# Patient Record
Sex: FEMALE | Race: ASIAN | NOT HISPANIC OR LATINO | URBAN - METROPOLITAN AREA
[De-identification: names, ages, dates, MRNs, and addresses within clinical notes are randomized per-mention and may not be internally consistent; named-entity substitution may affect disease eponyms.]

---

## 2017-12-28 ENCOUNTER — EMERGENCY (EMERGENCY)
Facility: HOSPITAL | Age: 22
LOS: 1 days | Discharge: HOME | End: 2017-12-28
Attending: EMERGENCY MEDICINE | Admitting: EMERGENCY MEDICINE
Payer: COMMERCIAL

## 2017-12-28 VITALS
SYSTOLIC BLOOD PRESSURE: 117 MMHG | DIASTOLIC BLOOD PRESSURE: 78 MMHG | TEMPERATURE: 98 F | HEART RATE: 114 BPM | OXYGEN SATURATION: 100 % | RESPIRATION RATE: 18 BRPM

## 2017-12-28 PROCEDURE — 99285 EMERGENCY DEPT VISIT HI MDM: CPT | Mod: 25

## 2017-12-28 RX ORDER — SODIUM CHLORIDE 9 MG/ML
1000 INJECTION INTRAMUSCULAR; INTRAVENOUS; SUBCUTANEOUS ONCE
Qty: 0 | Refills: 0 | Status: COMPLETED | OUTPATIENT
Start: 2017-12-28 | End: 2017-12-28

## 2017-12-28 NOTE — ED PROVIDER NOTE - CHPI ED SYMPTOMS NEG
no vomiting/no fever/no diarrhea/no blood in stool/no abdominal distension/no burning urination/no chills/no hematuria

## 2017-12-28 NOTE — ED PROVIDER NOTE - OBJECTIVE STATEMENT
21 yo F  5week 3/ by dates p/w lower abd pain x past ~ 2 weeks. Pt found pos preg test 3 days ago. LMP . NO cp/sob/palp. No Dysuria. NO HEmaturia. No recent illness. no recent trauma. NO fever/chills. NO weak / dizzy / malaise. no numb/ting/focal weak. No agg/allev factors. No other inj or co.

## 2017-12-28 NOTE — ED ADULT NURSE NOTE - OBJECTIVE STATEMENT
Pt received ambulatory alert and oriented x 3 c/o abdominal pain. Pt stated 8/10 cramping pain to lower abdomen, non radiating x 2 weeks, worsen past few days.. Pt stated she is approx 6 weeks pregnant. Pt denies any nausea, vomiting, diarrhea, fever, chill, sob, chest pain. Pt also denies burning urination and hematuria. No s/s of distress noted.

## 2017-12-29 VITALS
OXYGEN SATURATION: 99 % | DIASTOLIC BLOOD PRESSURE: 79 MMHG | HEART RATE: 100 BPM | TEMPERATURE: 99 F | SYSTOLIC BLOOD PRESSURE: 126 MMHG | RESPIRATION RATE: 16 BRPM

## 2017-12-29 LAB
ALBUMIN SERPL ELPH-MCNC: 4.3 G/DL — SIGNIFICANT CHANGE UP (ref 3.3–5)
ALP SERPL-CCNC: 58 U/L — SIGNIFICANT CHANGE UP (ref 40–120)
ALT FLD-CCNC: 18 U/L — SIGNIFICANT CHANGE UP (ref 12–78)
ANION GAP SERPL CALC-SCNC: 8 MMOL/L — SIGNIFICANT CHANGE UP (ref 5–17)
APPEARANCE UR: CLEAR — SIGNIFICANT CHANGE UP
AST SERPL-CCNC: 14 U/L — LOW (ref 15–37)
BASOPHILS # BLD AUTO: 0.2 K/UL — SIGNIFICANT CHANGE UP (ref 0–0.2)
BASOPHILS NFR BLD AUTO: 1.4 % — SIGNIFICANT CHANGE UP (ref 0–2)
BILIRUB SERPL-MCNC: 0.4 MG/DL — SIGNIFICANT CHANGE UP (ref 0.2–1.2)
BILIRUB UR-MCNC: NEGATIVE — SIGNIFICANT CHANGE UP
BLD GP AB SCN SERPL QL: SIGNIFICANT CHANGE UP
BUN SERPL-MCNC: 7 MG/DL — SIGNIFICANT CHANGE UP (ref 7–23)
CALCIUM SERPL-MCNC: 9.1 MG/DL — SIGNIFICANT CHANGE UP (ref 8.5–10.1)
CHLORIDE SERPL-SCNC: 104 MMOL/L — SIGNIFICANT CHANGE UP (ref 96–108)
CO2 SERPL-SCNC: 26 MMOL/L — SIGNIFICANT CHANGE UP (ref 22–31)
COLOR SPEC: YELLOW — SIGNIFICANT CHANGE UP
CREAT SERPL-MCNC: 0.66 MG/DL — SIGNIFICANT CHANGE UP (ref 0.5–1.3)
DIFF PNL FLD: NEGATIVE — SIGNIFICANT CHANGE UP
EOSINOPHIL # BLD AUTO: 0.4 K/UL — SIGNIFICANT CHANGE UP (ref 0–0.5)
EOSINOPHIL NFR BLD AUTO: 2.9 % — SIGNIFICANT CHANGE UP (ref 0–6)
GLUCOSE SERPL-MCNC: 91 MG/DL — SIGNIFICANT CHANGE UP (ref 70–99)
GLUCOSE UR QL: NEGATIVE — SIGNIFICANT CHANGE UP
HCG SERPL-ACNC: SIGNIFICANT CHANGE UP MIU/ML
HCT VFR BLD CALC: 40.5 % — SIGNIFICANT CHANGE UP (ref 34.5–45)
HGB BLD-MCNC: 13.1 G/DL — SIGNIFICANT CHANGE UP (ref 11.5–15.5)
KETONES UR-MCNC: NEGATIVE — SIGNIFICANT CHANGE UP
LEUKOCYTE ESTERASE UR-ACNC: NEGATIVE — SIGNIFICANT CHANGE UP
LIDOCAIN IGE QN: 154 U/L — SIGNIFICANT CHANGE UP (ref 73–393)
LYMPHOCYTES # BLD AUTO: 31.3 % — SIGNIFICANT CHANGE UP (ref 13–44)
LYMPHOCYTES # BLD AUTO: 4 K/UL — HIGH (ref 1–3.3)
MCHC RBC-ENTMCNC: 27.4 PG — SIGNIFICANT CHANGE UP (ref 27–34)
MCHC RBC-ENTMCNC: 32.4 GM/DL — SIGNIFICANT CHANGE UP (ref 32–36)
MCV RBC AUTO: 84.6 FL — SIGNIFICANT CHANGE UP (ref 80–100)
MONOCYTES # BLD AUTO: 0.9 K/UL — SIGNIFICANT CHANGE UP (ref 0–0.9)
MONOCYTES NFR BLD AUTO: 7.2 % — SIGNIFICANT CHANGE UP (ref 1–9)
NEUTROPHILS # BLD AUTO: 7.2 K/UL — SIGNIFICANT CHANGE UP (ref 1.8–7.4)
NEUTROPHILS NFR BLD AUTO: 57.2 % — SIGNIFICANT CHANGE UP (ref 43–77)
NITRITE UR-MCNC: NEGATIVE — SIGNIFICANT CHANGE UP
PH UR: 6.5 — SIGNIFICANT CHANGE UP (ref 5–8)
PLATELET # BLD AUTO: 342 K/UL — SIGNIFICANT CHANGE UP (ref 150–400)
POTASSIUM SERPL-MCNC: 3.7 MMOL/L — SIGNIFICANT CHANGE UP (ref 3.5–5.3)
POTASSIUM SERPL-SCNC: 3.7 MMOL/L — SIGNIFICANT CHANGE UP (ref 3.5–5.3)
PROT SERPL-MCNC: 8 G/DL — SIGNIFICANT CHANGE UP (ref 6–8.3)
PROT UR-MCNC: NEGATIVE — SIGNIFICANT CHANGE UP
RBC # BLD: 4.79 M/UL — SIGNIFICANT CHANGE UP (ref 3.8–5.2)
RBC # FLD: 13.4 % — SIGNIFICANT CHANGE UP (ref 10.3–14.5)
SODIUM SERPL-SCNC: 138 MMOL/L — SIGNIFICANT CHANGE UP (ref 135–145)
SP GR SPEC: 1 — LOW (ref 1.01–1.02)
UROBILINOGEN FLD QL: NEGATIVE — SIGNIFICANT CHANGE UP
WBC # BLD: 12.6 K/UL — HIGH (ref 3.8–10.5)
WBC # FLD AUTO: 12.6 K/UL — HIGH (ref 3.8–10.5)

## 2017-12-29 PROCEDURE — 81003 URINALYSIS AUTO W/O SCOPE: CPT

## 2017-12-29 PROCEDURE — 84702 CHORIONIC GONADOTROPIN TEST: CPT

## 2017-12-29 PROCEDURE — 99284 EMERGENCY DEPT VISIT MOD MDM: CPT | Mod: 25

## 2017-12-29 PROCEDURE — 86850 RBC ANTIBODY SCREEN: CPT

## 2017-12-29 PROCEDURE — 76801 OB US < 14 WKS SINGLE FETUS: CPT | Mod: 26

## 2017-12-29 PROCEDURE — 83690 ASSAY OF LIPASE: CPT

## 2017-12-29 PROCEDURE — 80053 COMPREHEN METABOLIC PANEL: CPT

## 2017-12-29 PROCEDURE — 36000 PLACE NEEDLE IN VEIN: CPT

## 2017-12-29 PROCEDURE — 76801 OB US < 14 WKS SINGLE FETUS: CPT

## 2017-12-29 PROCEDURE — 86900 BLOOD TYPING SEROLOGIC ABO: CPT

## 2017-12-29 PROCEDURE — 76817 TRANSVAGINAL US OBSTETRIC: CPT | Mod: 26

## 2017-12-29 PROCEDURE — 76817 TRANSVAGINAL US OBSTETRIC: CPT

## 2017-12-29 PROCEDURE — 86901 BLOOD TYPING SEROLOGIC RH(D): CPT

## 2017-12-29 PROCEDURE — 85027 COMPLETE CBC AUTOMATED: CPT

## 2017-12-29 RX ADMIN — SODIUM CHLORIDE 1000 MILLILITER(S): 9 INJECTION INTRAMUSCULAR; INTRAVENOUS; SUBCUTANEOUS at 00:20

## 2017-12-29 RX ADMIN — SODIUM CHLORIDE 1000 MILLILITER(S): 9 INJECTION INTRAMUSCULAR; INTRAVENOUS; SUBCUTANEOUS at 01:52

## 2018-02-09 PROBLEM — Z00.00 ENCOUNTER FOR PREVENTIVE HEALTH EXAMINATION: Status: ACTIVE | Noted: 2018-02-09

## 2018-02-12 ENCOUNTER — ASOB RESULT (OUTPATIENT)
Age: 23
End: 2018-02-12

## 2018-02-12 ENCOUNTER — APPOINTMENT (OUTPATIENT)
Dept: ANTEPARTUM | Facility: HOSPITAL | Age: 23
End: 2018-02-12
Payer: MEDICAID

## 2018-02-12 PROCEDURE — 76813 OB US NUCHAL MEAS 1 GEST: CPT

## 2018-02-12 PROCEDURE — 76801 OB US < 14 WKS SINGLE FETUS: CPT

## 2018-02-12 PROCEDURE — 36416 COLLJ CAPILLARY BLOOD SPEC: CPT

## 2018-04-03 ENCOUNTER — APPOINTMENT (OUTPATIENT)
Dept: ANTEPARTUM | Facility: CLINIC | Age: 23
End: 2018-04-03

## 2018-04-26 ENCOUNTER — ASOB RESULT (OUTPATIENT)
Age: 23
End: 2018-04-26

## 2018-04-26 ENCOUNTER — APPOINTMENT (OUTPATIENT)
Dept: ANTEPARTUM | Facility: CLINIC | Age: 23
End: 2018-04-26
Payer: MEDICAID

## 2018-04-26 PROCEDURE — 76805 OB US >/= 14 WKS SNGL FETUS: CPT

## 2018-06-21 ENCOUNTER — ASOB RESULT (OUTPATIENT)
Age: 23
End: 2018-06-21

## 2018-06-21 ENCOUNTER — APPOINTMENT (OUTPATIENT)
Dept: ANTEPARTUM | Facility: CLINIC | Age: 23
End: 2018-06-21
Payer: MEDICAID

## 2018-06-21 PROCEDURE — 76819 FETAL BIOPHYS PROFIL W/O NST: CPT

## 2018-06-21 PROCEDURE — 76816 OB US FOLLOW-UP PER FETUS: CPT

## 2018-07-12 ENCOUNTER — APPOINTMENT (OUTPATIENT)
Dept: ANTEPARTUM | Facility: CLINIC | Age: 23
End: 2018-07-12
Payer: MEDICAID

## 2018-07-12 ENCOUNTER — ASOB RESULT (OUTPATIENT)
Age: 23
End: 2018-07-12

## 2018-07-12 ENCOUNTER — OUTPATIENT (OUTPATIENT)
Dept: OUTPATIENT SERVICES | Facility: HOSPITAL | Age: 23
LOS: 1 days | End: 2018-07-12

## 2018-07-12 PROCEDURE — 99241 OFFICE CONSULTATION NEW/ESTAB PATIENT 15 MIN: CPT | Mod: 25

## 2018-07-12 PROCEDURE — 76816 OB US FOLLOW-UP PER FETUS: CPT

## 2018-07-12 PROCEDURE — 76820 UMBILICAL ARTERY ECHO: CPT

## 2018-07-12 PROCEDURE — 76818 FETAL BIOPHYS PROFILE W/NST: CPT | Mod: 26

## 2018-07-19 ENCOUNTER — ASOB RESULT (OUTPATIENT)
Age: 23
End: 2018-07-19

## 2018-07-19 ENCOUNTER — APPOINTMENT (OUTPATIENT)
Dept: ANTEPARTUM | Facility: HOSPITAL | Age: 23
End: 2018-07-19

## 2018-07-19 ENCOUNTER — OUTPATIENT (OUTPATIENT)
Dept: OUTPATIENT SERVICES | Facility: HOSPITAL | Age: 23
LOS: 1 days | End: 2018-07-19

## 2018-07-19 ENCOUNTER — APPOINTMENT (OUTPATIENT)
Dept: ANTEPARTUM | Facility: CLINIC | Age: 23
End: 2018-07-19
Payer: MEDICAID

## 2018-07-19 PROCEDURE — 76818 FETAL BIOPHYS PROFILE W/NST: CPT | Mod: 26

## 2018-07-19 PROCEDURE — 76820 UMBILICAL ARTERY ECHO: CPT

## 2018-07-26 ENCOUNTER — APPOINTMENT (OUTPATIENT)
Dept: ANTEPARTUM | Facility: CLINIC | Age: 23
End: 2018-07-26
Payer: MEDICAID

## 2018-07-26 ENCOUNTER — APPOINTMENT (OUTPATIENT)
Dept: ANTEPARTUM | Facility: HOSPITAL | Age: 23
End: 2018-07-26

## 2018-07-26 ENCOUNTER — OUTPATIENT (OUTPATIENT)
Dept: OUTPATIENT SERVICES | Facility: HOSPITAL | Age: 23
LOS: 1 days | End: 2018-07-26

## 2018-07-26 ENCOUNTER — ASOB RESULT (OUTPATIENT)
Age: 23
End: 2018-07-26

## 2018-07-26 PROCEDURE — 76820 UMBILICAL ARTERY ECHO: CPT

## 2018-07-26 PROCEDURE — 76816 OB US FOLLOW-UP PER FETUS: CPT

## 2018-07-26 PROCEDURE — 76818 FETAL BIOPHYS PROFILE W/NST: CPT | Mod: 26

## 2018-08-02 ENCOUNTER — APPOINTMENT (OUTPATIENT)
Dept: ANTEPARTUM | Facility: CLINIC | Age: 23
End: 2018-08-02
Payer: MEDICAID

## 2018-08-02 ENCOUNTER — OUTPATIENT (OUTPATIENT)
Dept: OUTPATIENT SERVICES | Facility: HOSPITAL | Age: 23
LOS: 1 days | End: 2018-08-02

## 2018-08-02 ENCOUNTER — APPOINTMENT (OUTPATIENT)
Dept: ANTEPARTUM | Facility: HOSPITAL | Age: 23
End: 2018-08-02

## 2018-08-02 ENCOUNTER — ASOB RESULT (OUTPATIENT)
Age: 23
End: 2018-08-02

## 2018-08-02 PROCEDURE — 76820 UMBILICAL ARTERY ECHO: CPT

## 2018-08-02 PROCEDURE — 76818 FETAL BIOPHYS PROFILE W/NST: CPT | Mod: 26

## 2018-08-07 DIAGNOSIS — Z3A.35 35 WEEKS GESTATION OF PREGNANCY: ICD-10-CM

## 2018-08-07 DIAGNOSIS — O36.5930 MATERNAL CARE FOR OTHER KNOWN OR SUSPECTED POOR FETAL GROWTH, THIRD TRIMESTER, NOT APPLICABLE OR UNSPECIFIED: ICD-10-CM

## 2018-08-07 DIAGNOSIS — O26.841 UTERINE SIZE-DATE DISCREPANCY, FIRST TRIMESTER: ICD-10-CM

## 2018-08-07 DIAGNOSIS — Z3A.33 33 WEEKS GESTATION OF PREGNANCY: ICD-10-CM

## 2018-08-07 DIAGNOSIS — Z3A.34 34 WEEKS GESTATION OF PREGNANCY: ICD-10-CM

## 2018-08-09 ENCOUNTER — OUTPATIENT (OUTPATIENT)
Dept: OUTPATIENT SERVICES | Facility: HOSPITAL | Age: 23
LOS: 1 days | End: 2018-08-09

## 2018-08-09 ENCOUNTER — APPOINTMENT (OUTPATIENT)
Dept: ANTEPARTUM | Facility: CLINIC | Age: 23
End: 2018-08-09

## 2018-08-09 ENCOUNTER — ASOB RESULT (OUTPATIENT)
Age: 23
End: 2018-08-09

## 2018-08-09 ENCOUNTER — APPOINTMENT (OUTPATIENT)
Dept: ANTEPARTUM | Facility: HOSPITAL | Age: 23
End: 2018-08-09

## 2018-08-09 ENCOUNTER — APPOINTMENT (OUTPATIENT)
Dept: ANTEPARTUM | Facility: CLINIC | Age: 23
End: 2018-08-09
Payer: MEDICAID

## 2018-08-09 PROCEDURE — 76820 UMBILICAL ARTERY ECHO: CPT

## 2018-08-09 PROCEDURE — 76816 OB US FOLLOW-UP PER FETUS: CPT

## 2018-08-09 PROCEDURE — 76818 FETAL BIOPHYS PROFILE W/NST: CPT | Mod: 26

## 2018-08-13 ENCOUNTER — OUTPATIENT (OUTPATIENT)
Dept: OUTPATIENT SERVICES | Facility: HOSPITAL | Age: 23
LOS: 1 days | End: 2018-08-13
Payer: MEDICAID

## 2018-08-13 DIAGNOSIS — O36.5930 MATERNAL CARE FOR OTHER KNOWN OR SUSPECTED POOR FETAL GROWTH, THIRD TRIMESTER, NOT APPLICABLE OR UNSPECIFIED: ICD-10-CM

## 2018-08-13 DIAGNOSIS — Z3A.00 WEEKS OF GESTATION OF PREGNANCY NOT SPECIFIED: ICD-10-CM

## 2018-08-13 DIAGNOSIS — O26.841 UTERINE SIZE-DATE DISCREPANCY, FIRST TRIMESTER: ICD-10-CM

## 2018-08-13 DIAGNOSIS — O26.899 OTHER SPECIFIED PREGNANCY RELATED CONDITIONS, UNSPECIFIED TRIMESTER: ICD-10-CM

## 2018-08-13 DIAGNOSIS — Z3A.37 37 WEEKS GESTATION OF PREGNANCY: ICD-10-CM

## 2018-08-13 LAB
ALBUMIN SERPL ELPH-MCNC: 3.8 G/DL — SIGNIFICANT CHANGE UP (ref 3.3–5)
ALP SERPL-CCNC: 158 U/L — HIGH (ref 40–120)
ALT FLD-CCNC: 13 U/L — SIGNIFICANT CHANGE UP (ref 4–33)
APPEARANCE UR: CLEAR — SIGNIFICANT CHANGE UP
APTT BLD: 25.7 SEC — LOW (ref 27.5–37.4)
AST SERPL-CCNC: 20 U/L — SIGNIFICANT CHANGE UP (ref 4–32)
BACTERIA # UR AUTO: SIGNIFICANT CHANGE UP
BASOPHILS # BLD AUTO: 0.05 K/UL — SIGNIFICANT CHANGE UP (ref 0–0.2)
BASOPHILS NFR BLD AUTO: 0.4 % — SIGNIFICANT CHANGE UP (ref 0–2)
BILIRUB SERPL-MCNC: 0.2 MG/DL — SIGNIFICANT CHANGE UP (ref 0.2–1.2)
BILIRUB UR-MCNC: NEGATIVE — SIGNIFICANT CHANGE UP
BLOOD UR QL VISUAL: NEGATIVE — SIGNIFICANT CHANGE UP
BUN SERPL-MCNC: 7 MG/DL — SIGNIFICANT CHANGE UP (ref 7–23)
CALCIUM SERPL-MCNC: 8.9 MG/DL — SIGNIFICANT CHANGE UP (ref 8.4–10.5)
CHLORIDE SERPL-SCNC: 100 MMOL/L — SIGNIFICANT CHANGE UP (ref 98–107)
CO2 SERPL-SCNC: 22 MMOL/L — SIGNIFICANT CHANGE UP (ref 22–31)
COLOR SPEC: SIGNIFICANT CHANGE UP
CREAT ?TM UR-MCNC: 30.5 MG/DL — SIGNIFICANT CHANGE UP
CREAT SERPL-MCNC: 0.64 MG/DL — SIGNIFICANT CHANGE UP (ref 0.5–1.3)
EOSINOPHIL # BLD AUTO: 0.16 K/UL — SIGNIFICANT CHANGE UP (ref 0–0.5)
EOSINOPHIL NFR BLD AUTO: 1.2 % — SIGNIFICANT CHANGE UP (ref 0–6)
FIBRINOGEN PPP-MCNC: > 700 MG/DL — HIGH (ref 310–510)
GLUCOSE SERPL-MCNC: 118 MG/DL — HIGH (ref 70–99)
GLUCOSE UR-MCNC: NEGATIVE — SIGNIFICANT CHANGE UP
HCT VFR BLD CALC: 34.6 % — SIGNIFICANT CHANGE UP (ref 34.5–45)
HGB BLD-MCNC: 11 G/DL — LOW (ref 11.5–15.5)
IMM GRANULOCYTES # BLD AUTO: 0.37 # — SIGNIFICANT CHANGE UP
IMM GRANULOCYTES NFR BLD AUTO: 2.8 % — HIGH (ref 0–1.5)
INR BLD: 0.9 — SIGNIFICANT CHANGE UP (ref 0.88–1.17)
KETONES UR-MCNC: NEGATIVE — SIGNIFICANT CHANGE UP
LDH SERPL L TO P-CCNC: 192 U/L — SIGNIFICANT CHANGE UP (ref 135–225)
LEUKOCYTE ESTERASE UR-ACNC: NEGATIVE — SIGNIFICANT CHANGE UP
LYMPHOCYTES # BLD AUTO: 15.8 % — SIGNIFICANT CHANGE UP (ref 13–44)
LYMPHOCYTES # BLD AUTO: 2.11 K/UL — SIGNIFICANT CHANGE UP (ref 1–3.3)
MCHC RBC-ENTMCNC: 26.1 PG — LOW (ref 27–34)
MCHC RBC-ENTMCNC: 31.8 % — LOW (ref 32–36)
MCV RBC AUTO: 82.2 FL — SIGNIFICANT CHANGE UP (ref 80–100)
MONOCYTES # BLD AUTO: 0.76 K/UL — SIGNIFICANT CHANGE UP (ref 0–0.9)
MONOCYTES NFR BLD AUTO: 5.7 % — SIGNIFICANT CHANGE UP (ref 2–14)
NEUTROPHILS # BLD AUTO: 9.94 K/UL — HIGH (ref 1.8–7.4)
NEUTROPHILS NFR BLD AUTO: 74.1 % — SIGNIFICANT CHANGE UP (ref 43–77)
NITRITE UR-MCNC: NEGATIVE — SIGNIFICANT CHANGE UP
NON-SQ EPI CELLS # UR AUTO: <1 — SIGNIFICANT CHANGE UP
NRBC # FLD: 0 — SIGNIFICANT CHANGE UP
PH UR: 6.5 — SIGNIFICANT CHANGE UP (ref 4.6–8)
PLATELET # BLD AUTO: 282 K/UL — SIGNIFICANT CHANGE UP (ref 150–400)
PMV BLD: 10.8 FL — SIGNIFICANT CHANGE UP (ref 7–13)
POTASSIUM SERPL-MCNC: 3.4 MMOL/L — LOW (ref 3.5–5.3)
POTASSIUM SERPL-SCNC: 3.4 MMOL/L — LOW (ref 3.5–5.3)
PROT SERPL-MCNC: 7 G/DL — SIGNIFICANT CHANGE UP (ref 6–8.3)
PROT UR-MCNC: 4.6 MG/DL — SIGNIFICANT CHANGE UP
PROT UR-MCNC: NEGATIVE MG/DL — SIGNIFICANT CHANGE UP
PROTHROM AB SERPL-ACNC: 10.3 SEC — SIGNIFICANT CHANGE UP (ref 9.8–13.1)
RBC # BLD: 4.21 M/UL — SIGNIFICANT CHANGE UP (ref 3.8–5.2)
RBC # FLD: 14.8 % — HIGH (ref 10.3–14.5)
RBC CASTS # UR COMP ASSIST: SIGNIFICANT CHANGE UP (ref 0–?)
SODIUM SERPL-SCNC: 135 MMOL/L — SIGNIFICANT CHANGE UP (ref 135–145)
SP GR SPEC: 1 — SIGNIFICANT CHANGE UP (ref 1–1.04)
SQUAMOUS # UR AUTO: SIGNIFICANT CHANGE UP
URATE SERPL-MCNC: 4.4 MG/DL — SIGNIFICANT CHANGE UP (ref 2.5–7)
UROBILINOGEN FLD QL: NORMAL MG/DL — SIGNIFICANT CHANGE UP
WBC # BLD: 13.39 K/UL — HIGH (ref 3.8–10.5)
WBC # FLD AUTO: 13.39 K/UL — HIGH (ref 3.8–10.5)
WBC UR QL: SIGNIFICANT CHANGE UP (ref 0–?)

## 2018-08-13 PROCEDURE — 76818 FETAL BIOPHYS PROFILE W/NST: CPT | Mod: 26

## 2018-08-13 PROCEDURE — 99215 OFFICE O/P EST HI 40 MIN: CPT | Mod: 25

## 2018-08-13 RX ORDER — ACETAMINOPHEN 500 MG
975 TABLET ORAL ONCE
Qty: 0 | Refills: 0 | Status: COMPLETED | OUTPATIENT
Start: 2018-08-13 | End: 2018-08-13

## 2018-08-13 RX ORDER — SODIUM CHLORIDE 9 MG/ML
3 INJECTION INTRAMUSCULAR; INTRAVENOUS; SUBCUTANEOUS ONCE
Qty: 0 | Refills: 0 | Status: COMPLETED | OUTPATIENT
Start: 2018-08-13 | End: 2018-08-13

## 2018-08-13 RX ADMIN — Medication 975 MILLIGRAM(S): at 13:26

## 2018-08-13 RX ADMIN — SODIUM CHLORIDE 3 MILLILITER(S): 9 INJECTION INTRAMUSCULAR; INTRAVENOUS; SUBCUTANEOUS at 13:00

## 2018-08-13 RX ADMIN — Medication 975 MILLIGRAM(S): at 12:59

## 2018-08-16 ENCOUNTER — OUTPATIENT (OUTPATIENT)
Dept: OUTPATIENT SERVICES | Facility: HOSPITAL | Age: 23
LOS: 1 days | End: 2018-08-16

## 2018-08-16 ENCOUNTER — ASOB RESULT (OUTPATIENT)
Age: 23
End: 2018-08-16

## 2018-08-16 ENCOUNTER — APPOINTMENT (OUTPATIENT)
Dept: ANTEPARTUM | Facility: CLINIC | Age: 23
End: 2018-08-16
Payer: MEDICAID

## 2018-08-16 ENCOUNTER — APPOINTMENT (OUTPATIENT)
Dept: ANTEPARTUM | Facility: HOSPITAL | Age: 23
End: 2018-08-16

## 2018-08-16 DIAGNOSIS — O36.5931 MATERNAL CARE FOR OTHER KNOWN OR SUSPECTED POOR FETAL GROWTH, THIRD TRIMESTER, FETUS 1: ICD-10-CM

## 2018-08-16 LAB
APPEARANCE UR: CLEAR — SIGNIFICANT CHANGE UP
BACTERIA # UR AUTO: SIGNIFICANT CHANGE UP
BILIRUB UR-MCNC: NEGATIVE — SIGNIFICANT CHANGE UP
BLD GP AB SCN SERPL QL: NEGATIVE — SIGNIFICANT CHANGE UP
BLOOD UR QL VISUAL: NEGATIVE — SIGNIFICANT CHANGE UP
COLOR SPEC: SIGNIFICANT CHANGE UP
GLUCOSE UR-MCNC: NEGATIVE — SIGNIFICANT CHANGE UP
HCT VFR BLD CALC: 33 % — LOW (ref 34.5–45)
HGB BLD-MCNC: 10.7 G/DL — LOW (ref 11.5–15.5)
KETONES UR-MCNC: NEGATIVE — SIGNIFICANT CHANGE UP
LEUKOCYTE ESTERASE UR-ACNC: NEGATIVE — SIGNIFICANT CHANGE UP
MCHC RBC-ENTMCNC: 26.3 PG — LOW (ref 27–34)
MCHC RBC-ENTMCNC: 32.4 % — SIGNIFICANT CHANGE UP (ref 32–36)
MCV RBC AUTO: 81.1 FL — SIGNIFICANT CHANGE UP (ref 80–100)
NITRITE UR-MCNC: NEGATIVE — SIGNIFICANT CHANGE UP
NRBC # FLD: 0 — SIGNIFICANT CHANGE UP
PH UR: 6.5 — SIGNIFICANT CHANGE UP (ref 5–8)
PLATELET # BLD AUTO: 267 K/UL — SIGNIFICANT CHANGE UP (ref 150–400)
PMV BLD: 11.1 FL — SIGNIFICANT CHANGE UP (ref 7–13)
PROT UR-MCNC: NEGATIVE — SIGNIFICANT CHANGE UP
RBC # BLD: 4.07 M/UL — SIGNIFICANT CHANGE UP (ref 3.8–5.2)
RBC # FLD: 15 % — HIGH (ref 10.3–14.5)
RBC CASTS # UR COMP ASSIST: SIGNIFICANT CHANGE UP
RH IG SCN BLD-IMP: POSITIVE — SIGNIFICANT CHANGE UP
SP GR SPEC: 1.01 — SIGNIFICANT CHANGE UP (ref 1–1.04)
SQUAMOUS # UR AUTO: SIGNIFICANT CHANGE UP
T PALLIDUM AB TITR SER: NEGATIVE — SIGNIFICANT CHANGE UP
UROBILINOGEN FLD QL: NORMAL — SIGNIFICANT CHANGE UP
WBC # BLD: 13.34 K/UL — HIGH (ref 3.8–10.5)
WBC # FLD AUTO: 13.34 K/UL — HIGH (ref 3.8–10.5)
WBC UR QL: SIGNIFICANT CHANGE UP

## 2018-08-16 PROCEDURE — 76820 UMBILICAL ARTERY ECHO: CPT

## 2018-08-16 PROCEDURE — 76818 FETAL BIOPHYS PROFILE W/NST: CPT | Mod: 26

## 2018-08-17 ENCOUNTER — APPOINTMENT (OUTPATIENT)
Dept: ANTEPARTUM | Facility: HOSPITAL | Age: 23
End: 2018-08-17

## 2018-08-21 DIAGNOSIS — O36.5930 MATERNAL CARE FOR OTHER KNOWN OR SUSPECTED POOR FETAL GROWTH, THIRD TRIMESTER, NOT APPLICABLE OR UNSPECIFIED: ICD-10-CM

## 2018-08-21 DIAGNOSIS — Z3A.36 36 WEEKS GESTATION OF PREGNANCY: ICD-10-CM

## 2018-08-21 DIAGNOSIS — O26.841 UTERINE SIZE-DATE DISCREPANCY, FIRST TRIMESTER: ICD-10-CM

## 2018-08-23 ENCOUNTER — APPOINTMENT (OUTPATIENT)
Dept: ANTEPARTUM | Facility: CLINIC | Age: 23
End: 2018-08-23
Payer: MEDICAID

## 2018-08-23 ENCOUNTER — APPOINTMENT (OUTPATIENT)
Dept: ANTEPARTUM | Facility: HOSPITAL | Age: 23
End: 2018-08-23

## 2018-08-23 ENCOUNTER — ASOB RESULT (OUTPATIENT)
Age: 23
End: 2018-08-23

## 2018-08-23 ENCOUNTER — OUTPATIENT (OUTPATIENT)
Dept: OUTPATIENT SERVICES | Facility: HOSPITAL | Age: 23
LOS: 1 days | End: 2018-08-23

## 2018-08-23 PROCEDURE — 76820 UMBILICAL ARTERY ECHO: CPT

## 2018-08-23 PROCEDURE — 76816 OB US FOLLOW-UP PER FETUS: CPT

## 2018-08-23 PROCEDURE — 76818 FETAL BIOPHYS PROFILE W/NST: CPT | Mod: 26

## 2018-08-24 ENCOUNTER — TRANSCRIPTION ENCOUNTER (OUTPATIENT)
Age: 23
End: 2018-08-24

## 2018-08-24 ENCOUNTER — INPATIENT (INPATIENT)
Facility: HOSPITAL | Age: 23
LOS: 3 days | Discharge: HOME | End: 2018-08-28
Attending: OBSTETRICS & GYNECOLOGY | Admitting: OBSTETRICS & GYNECOLOGY

## 2018-08-24 VITALS — HEIGHT: 61 IN | WEIGHT: 134.48 LBS

## 2018-08-24 DIAGNOSIS — O36.5931 MATERNAL CARE FOR OTHER KNOWN OR SUSPECTED POOR FETAL GROWTH, THIRD TRIMESTER, FETUS 1: ICD-10-CM

## 2018-08-24 RX ORDER — SODIUM CHLORIDE 9 MG/ML
1000 INJECTION, SOLUTION INTRAVENOUS
Qty: 0 | Refills: 0 | Status: DISCONTINUED | OUTPATIENT
Start: 2018-08-24 | End: 2018-08-25

## 2018-08-24 RX ORDER — SODIUM CHLORIDE 9 MG/ML
2000 INJECTION, SOLUTION INTRAVENOUS ONCE
Qty: 0 | Refills: 0 | Status: DISCONTINUED | OUTPATIENT
Start: 2018-08-24 | End: 2018-08-25

## 2018-08-24 RX ORDER — OXYTOCIN 10 UNIT/ML
333.33 VIAL (ML) INJECTION
Qty: 20 | Refills: 0 | Status: DISCONTINUED | OUTPATIENT
Start: 2018-08-24 | End: 2018-08-25

## 2018-08-24 RX ORDER — CITRIC ACID/SODIUM CITRATE 300-500 MG
15 SOLUTION, ORAL ORAL EVERY 4 HOURS
Qty: 0 | Refills: 0 | Status: DISCONTINUED | OUTPATIENT
Start: 2018-08-24 | End: 2018-08-25

## 2018-08-25 ENCOUNTER — TRANSCRIPTION ENCOUNTER (OUTPATIENT)
Age: 23
End: 2018-08-25

## 2018-08-25 LAB
BASOPHILS # BLD AUTO: 0.06 K/UL — SIGNIFICANT CHANGE UP (ref 0–0.2)
BASOPHILS NFR BLD AUTO: 0.5 % — SIGNIFICANT CHANGE UP (ref 0–2)
BLD GP AB SCN SERPL QL: NEGATIVE — SIGNIFICANT CHANGE UP
EOSINOPHIL # BLD AUTO: 0.15 K/UL — SIGNIFICANT CHANGE UP (ref 0–0.5)
EOSINOPHIL NFR BLD AUTO: 1.2 % — SIGNIFICANT CHANGE UP (ref 0–6)
HCT VFR BLD CALC: 32.1 % — LOW (ref 34.5–45)
HGB BLD-MCNC: 10.1 G/DL — LOW (ref 11.5–15.5)
IMM GRANULOCYTES # BLD AUTO: 0.19 # — SIGNIFICANT CHANGE UP
IMM GRANULOCYTES NFR BLD AUTO: 1.5 % — SIGNIFICANT CHANGE UP (ref 0–1.5)
LYMPHOCYTES # BLD AUTO: 18.4 % — SIGNIFICANT CHANGE UP (ref 13–44)
LYMPHOCYTES # BLD AUTO: 2.36 K/UL — SIGNIFICANT CHANGE UP (ref 1–3.3)
MCHC RBC-ENTMCNC: 25.6 PG — LOW (ref 27–34)
MCHC RBC-ENTMCNC: 31.5 % — LOW (ref 32–36)
MCV RBC AUTO: 81.5 FL — SIGNIFICANT CHANGE UP (ref 80–100)
MONOCYTES # BLD AUTO: 0.98 K/UL — HIGH (ref 0–0.9)
MONOCYTES NFR BLD AUTO: 7.6 % — SIGNIFICANT CHANGE UP (ref 2–14)
NEUTROPHILS # BLD AUTO: 9.08 K/UL — HIGH (ref 1.8–7.4)
NEUTROPHILS NFR BLD AUTO: 70.8 % — SIGNIFICANT CHANGE UP (ref 43–77)
NRBC # FLD: 0 — SIGNIFICANT CHANGE UP
PLATELET # BLD AUTO: 262 K/UL — SIGNIFICANT CHANGE UP (ref 150–400)
PMV BLD: 11.3 FL — SIGNIFICANT CHANGE UP (ref 7–13)
RBC # BLD: 3.94 M/UL — SIGNIFICANT CHANGE UP (ref 3.8–5.2)
RBC # FLD: 15.9 % — HIGH (ref 10.3–14.5)
RH IG SCN BLD-IMP: POSITIVE — SIGNIFICANT CHANGE UP
WBC # BLD: 12.82 K/UL — HIGH (ref 3.8–10.5)
WBC # FLD AUTO: 12.82 K/UL — HIGH (ref 3.8–10.5)

## 2018-08-25 RX ORDER — HYDROMORPHONE HYDROCHLORIDE 2 MG/ML
1 INJECTION INTRAMUSCULAR; INTRAVENOUS; SUBCUTANEOUS
Qty: 0 | Refills: 0 | Status: DISCONTINUED | OUTPATIENT
Start: 2018-08-25 | End: 2018-08-25

## 2018-08-25 RX ORDER — ONDANSETRON 8 MG/1
4 TABLET, FILM COATED ORAL EVERY 6 HOURS
Qty: 0 | Refills: 0 | Status: DISCONTINUED | OUTPATIENT
Start: 2018-08-25 | End: 2018-08-26

## 2018-08-25 RX ORDER — DIPHENHYDRAMINE HCL 50 MG
25 CAPSULE ORAL EVERY 6 HOURS
Qty: 0 | Refills: 0 | Status: DISCONTINUED | OUTPATIENT
Start: 2018-08-25 | End: 2018-08-28

## 2018-08-25 RX ORDER — ACETAMINOPHEN 500 MG
975 TABLET ORAL EVERY 6 HOURS
Qty: 0 | Refills: 0 | Status: DISCONTINUED | OUTPATIENT
Start: 2018-08-25 | End: 2018-08-28

## 2018-08-25 RX ORDER — METOCLOPRAMIDE HCL 10 MG
10 TABLET ORAL ONCE
Qty: 0 | Refills: 0 | Status: DISCONTINUED | OUTPATIENT
Start: 2018-08-25 | End: 2018-08-25

## 2018-08-25 RX ORDER — HYDROMORPHONE HYDROCHLORIDE 2 MG/ML
0.5 INJECTION INTRAMUSCULAR; INTRAVENOUS; SUBCUTANEOUS
Qty: 0 | Refills: 0 | Status: DISCONTINUED | OUTPATIENT
Start: 2018-08-25 | End: 2018-08-25

## 2018-08-25 RX ORDER — OXYCODONE HYDROCHLORIDE 5 MG/1
5 TABLET ORAL
Qty: 0 | Refills: 0 | Status: COMPLETED | OUTPATIENT
Start: 2018-08-25 | End: 2018-09-01

## 2018-08-25 RX ORDER — KETOROLAC TROMETHAMINE 30 MG/ML
30 SYRINGE (ML) INJECTION EVERY 6 HOURS
Qty: 0 | Refills: 0 | Status: DISCONTINUED | OUTPATIENT
Start: 2018-08-25 | End: 2018-08-26

## 2018-08-25 RX ORDER — LANOLIN
1 OINTMENT (GRAM) TOPICAL
Qty: 0 | Refills: 0 | Status: DISCONTINUED | OUTPATIENT
Start: 2018-08-25 | End: 2018-08-28

## 2018-08-25 RX ORDER — MAGNESIUM HYDROXIDE 400 MG/1
30 TABLET, CHEWABLE ORAL
Qty: 0 | Refills: 0 | Status: DISCONTINUED | OUTPATIENT
Start: 2018-08-25 | End: 2018-08-28

## 2018-08-25 RX ORDER — TETANUS TOXOID, REDUCED DIPHTHERIA TOXOID AND ACELLULAR PERTUSSIS VACCINE, ADSORBED 5; 2.5; 8; 8; 2.5 [IU]/.5ML; [IU]/.5ML; UG/.5ML; UG/.5ML; UG/.5ML
0.5 SUSPENSION INTRAMUSCULAR ONCE
Qty: 0 | Refills: 0 | Status: COMPLETED | OUTPATIENT
Start: 2018-08-25

## 2018-08-25 RX ORDER — HEPARIN SODIUM 5000 [USP'U]/ML
5000 INJECTION INTRAVENOUS; SUBCUTANEOUS EVERY 12 HOURS
Qty: 0 | Refills: 0 | Status: DISCONTINUED | OUTPATIENT
Start: 2018-08-25 | End: 2018-08-28

## 2018-08-25 RX ORDER — IBUPROFEN 200 MG
600 TABLET ORAL EVERY 6 HOURS
Qty: 0 | Refills: 0 | Status: COMPLETED | OUTPATIENT
Start: 2018-08-25 | End: 2019-07-24

## 2018-08-25 RX ORDER — SIMETHICONE 80 MG/1
80 TABLET, CHEWABLE ORAL EVERY 4 HOURS
Qty: 0 | Refills: 0 | Status: DISCONTINUED | OUTPATIENT
Start: 2018-08-25 | End: 2018-08-28

## 2018-08-25 RX ORDER — HYDROMORPHONE HYDROCHLORIDE 2 MG/ML
1 INJECTION INTRAMUSCULAR; INTRAVENOUS; SUBCUTANEOUS
Qty: 0 | Refills: 0 | Status: DISCONTINUED | OUTPATIENT
Start: 2018-08-25 | End: 2018-08-26

## 2018-08-25 RX ORDER — ONDANSETRON 8 MG/1
4 TABLET, FILM COATED ORAL ONCE
Qty: 0 | Refills: 0 | Status: DISCONTINUED | OUTPATIENT
Start: 2018-08-25 | End: 2018-08-25

## 2018-08-25 RX ORDER — OXYTOCIN 10 UNIT/ML
41.67 VIAL (ML) INJECTION
Qty: 20 | Refills: 0 | Status: DISCONTINUED | OUTPATIENT
Start: 2018-08-25 | End: 2018-08-26

## 2018-08-25 RX ORDER — OXYCODONE HYDROCHLORIDE 5 MG/1
5 TABLET ORAL
Qty: 0 | Refills: 0 | Status: DISCONTINUED | OUTPATIENT
Start: 2018-08-25 | End: 2018-08-26

## 2018-08-25 RX ORDER — DOCUSATE SODIUM 100 MG
100 CAPSULE ORAL
Qty: 0 | Refills: 0 | Status: DISCONTINUED | OUTPATIENT
Start: 2018-08-25 | End: 2018-08-28

## 2018-08-25 RX ORDER — SODIUM CHLORIDE 9 MG/ML
1000 INJECTION, SOLUTION INTRAVENOUS
Qty: 0 | Refills: 0 | Status: DISCONTINUED | OUTPATIENT
Start: 2018-08-25 | End: 2018-08-26

## 2018-08-25 RX ORDER — FERROUS SULFATE 325(65) MG
325 TABLET ORAL DAILY
Qty: 0 | Refills: 0 | Status: DISCONTINUED | OUTPATIENT
Start: 2018-08-25 | End: 2018-08-26

## 2018-08-25 RX ORDER — OXYCODONE HYDROCHLORIDE 5 MG/1
10 TABLET ORAL
Qty: 0 | Refills: 0 | Status: DISCONTINUED | OUTPATIENT
Start: 2018-08-25 | End: 2018-08-26

## 2018-08-25 RX ORDER — NALOXONE HYDROCHLORIDE 4 MG/.1ML
0.1 SPRAY NASAL
Qty: 0 | Refills: 0 | Status: DISCONTINUED | OUTPATIENT
Start: 2018-08-25 | End: 2018-08-26

## 2018-08-25 RX ORDER — OXYCODONE HYDROCHLORIDE 5 MG/1
5 TABLET ORAL EVERY 4 HOURS
Qty: 0 | Refills: 0 | Status: COMPLETED | OUTPATIENT
Start: 2018-08-25 | End: 2018-09-01

## 2018-08-25 RX ORDER — HYDROMORPHONE HYDROCHLORIDE 2 MG/ML
0.5 INJECTION INTRAMUSCULAR; INTRAVENOUS; SUBCUTANEOUS
Qty: 0 | Refills: 0 | Status: DISCONTINUED | OUTPATIENT
Start: 2018-08-25 | End: 2018-08-26

## 2018-08-25 RX ORDER — GLYCERIN ADULT
1 SUPPOSITORY, RECTAL RECTAL AT BEDTIME
Qty: 0 | Refills: 0 | Status: DISCONTINUED | OUTPATIENT
Start: 2018-08-25 | End: 2018-08-28

## 2018-08-25 RX ADMIN — Medication 125 MILLIUNIT(S)/MIN: at 15:38

## 2018-08-25 RX ADMIN — Medication 30 MILLIGRAM(S): at 21:00

## 2018-08-25 RX ADMIN — SODIUM CHLORIDE 125 MILLILITER(S): 9 INJECTION, SOLUTION INTRAVENOUS at 00:24

## 2018-08-25 RX ADMIN — HEPARIN SODIUM 5000 UNIT(S): 5000 INJECTION INTRAVENOUS; SUBCUTANEOUS at 20:45

## 2018-08-25 RX ADMIN — SODIUM CHLORIDE 75 MILLILITER(S): 9 INJECTION, SOLUTION INTRAVENOUS at 15:39

## 2018-08-25 NOTE — DISCHARGE NOTE OB - CARE PLAN
Principal Discharge DX:	 delivery delivered  Goal:	Routine postop care  Assessment and plan of treatment:	Stable  Routine care

## 2018-08-25 NOTE — DISCHARGE NOTE OB - CARE PROVIDER_API CALL
Manisha Morales), Gynecology Obstetrics  Gynecology  [address] [place] [address]  Phone: [phone]  Fax: [phone]

## 2018-08-25 NOTE — PROVIDER CONTACT NOTE (OTHER) - RECOMMENDATIONS
as per MD, patient able to go to post partum room, terbutaline the reason for increased HR as per MD, will continue to monitor HR as per MD until bed available for pt. to go upstaits

## 2018-08-26 LAB
BASOPHILS # BLD AUTO: 0.03 K/UL — SIGNIFICANT CHANGE UP (ref 0–0.2)
BASOPHILS NFR BLD AUTO: 0.1 % — SIGNIFICANT CHANGE UP (ref 0–2)
EOSINOPHIL # BLD AUTO: 0.02 K/UL — SIGNIFICANT CHANGE UP (ref 0–0.5)
EOSINOPHIL NFR BLD AUTO: 0.1 % — SIGNIFICANT CHANGE UP (ref 0–6)
HCT VFR BLD CALC: 28.7 % — LOW (ref 34.5–45)
HGB BLD-MCNC: 9.6 G/DL — LOW (ref 11.5–15.5)
IMM GRANULOCYTES # BLD AUTO: 0.14 # — SIGNIFICANT CHANGE UP
IMM GRANULOCYTES NFR BLD AUTO: 0.7 % — SIGNIFICANT CHANGE UP (ref 0–1.5)
LYMPHOCYTES # BLD AUTO: 12.1 % — LOW (ref 13–44)
LYMPHOCYTES # BLD AUTO: 2.52 K/UL — SIGNIFICANT CHANGE UP (ref 1–3.3)
MCHC RBC-ENTMCNC: 27 PG — SIGNIFICANT CHANGE UP (ref 27–34)
MCHC RBC-ENTMCNC: 33.4 % — SIGNIFICANT CHANGE UP (ref 32–36)
MCV RBC AUTO: 80.6 FL — SIGNIFICANT CHANGE UP (ref 80–100)
MONOCYTES # BLD AUTO: 1.33 K/UL — HIGH (ref 0–0.9)
MONOCYTES NFR BLD AUTO: 6.4 % — SIGNIFICANT CHANGE UP (ref 2–14)
NEUTROPHILS # BLD AUTO: 16.83 K/UL — HIGH (ref 1.8–7.4)
NEUTROPHILS NFR BLD AUTO: 80.6 % — HIGH (ref 43–77)
NRBC # FLD: 0 — SIGNIFICANT CHANGE UP
PLATELET # BLD AUTO: 216 K/UL — SIGNIFICANT CHANGE UP (ref 150–400)
PMV BLD: 11.3 FL — SIGNIFICANT CHANGE UP (ref 7–13)
RBC # BLD: 3.56 M/UL — LOW (ref 3.8–5.2)
RBC # FLD: 16.3 % — HIGH (ref 10.3–14.5)
T PALLIDUM AB TITR SER: NEGATIVE — SIGNIFICANT CHANGE UP
WBC # BLD: 20.87 K/UL — HIGH (ref 3.8–10.5)
WBC # FLD AUTO: 20.87 K/UL — HIGH (ref 3.8–10.5)

## 2018-08-26 RX ORDER — BENZOCAINE AND MENTHOL 5; 1 G/100ML; G/100ML
1 LIQUID ORAL
Qty: 0 | Refills: 0 | Status: DISCONTINUED | OUTPATIENT
Start: 2018-08-26 | End: 2018-08-28

## 2018-08-26 RX ORDER — OXYCODONE HYDROCHLORIDE 5 MG/1
5 TABLET ORAL
Qty: 0 | Refills: 0 | Status: DISCONTINUED | OUTPATIENT
Start: 2018-08-26 | End: 2018-08-28

## 2018-08-26 RX ORDER — FERROUS SULFATE 325(65) MG
325 TABLET ORAL THREE TIMES A DAY
Qty: 0 | Refills: 0 | Status: DISCONTINUED | OUTPATIENT
Start: 2018-08-26 | End: 2018-08-28

## 2018-08-26 RX ORDER — BENZOCAINE AND MENTHOL 5; 1 G/100ML; G/100ML
1 LIQUID ORAL ONCE
Qty: 0 | Refills: 0 | Status: COMPLETED | OUTPATIENT
Start: 2018-08-26 | End: 2018-08-26

## 2018-08-26 RX ORDER — OXYCODONE HYDROCHLORIDE 5 MG/1
5 TABLET ORAL EVERY 4 HOURS
Qty: 0 | Refills: 0 | Status: DISCONTINUED | OUTPATIENT
Start: 2018-08-26 | End: 2018-08-28

## 2018-08-26 RX ORDER — IBUPROFEN 200 MG
600 TABLET ORAL EVERY 6 HOURS
Qty: 0 | Refills: 0 | Status: DISCONTINUED | OUTPATIENT
Start: 2018-08-26 | End: 2018-08-28

## 2018-08-26 RX ORDER — ASCORBIC ACID 60 MG
500 TABLET,CHEWABLE ORAL DAILY
Qty: 0 | Refills: 0 | Status: DISCONTINUED | OUTPATIENT
Start: 2018-08-26 | End: 2018-08-28

## 2018-08-26 RX ORDER — TETRACAINE/BENZOCAINE/BUTAMBEN 2%-14%-2%
1 OINTMENT (GRAM) TOPICAL THREE TIMES A DAY
Qty: 0 | Refills: 0 | Status: DISCONTINUED | OUTPATIENT
Start: 2018-08-26 | End: 2018-08-28

## 2018-08-26 RX ADMIN — Medication 600 MILLIGRAM(S): at 20:48

## 2018-08-26 RX ADMIN — Medication 975 MILLIGRAM(S): at 21:50

## 2018-08-26 RX ADMIN — Medication 975 MILLIGRAM(S): at 15:12

## 2018-08-26 RX ADMIN — Medication 600 MILLIGRAM(S): at 16:14

## 2018-08-26 RX ADMIN — Medication 1 TABLET(S): at 18:28

## 2018-08-26 RX ADMIN — HEPARIN SODIUM 5000 UNIT(S): 5000 INJECTION INTRAVENOUS; SUBCUTANEOUS at 09:44

## 2018-08-26 RX ADMIN — Medication 600 MILLIGRAM(S): at 15:14

## 2018-08-26 RX ADMIN — Medication 30 MILLIGRAM(S): at 04:30

## 2018-08-26 RX ADMIN — Medication 325 MILLIGRAM(S): at 18:28

## 2018-08-26 RX ADMIN — Medication 1 SPRAY(S): at 20:48

## 2018-08-26 RX ADMIN — BENZOCAINE AND MENTHOL 1 LOZENGE: 5; 1 LIQUID ORAL at 15:14

## 2018-08-26 RX ADMIN — Medication 30 MILLIGRAM(S): at 09:43

## 2018-08-26 RX ADMIN — Medication 600 MILLIGRAM(S): at 21:50

## 2018-08-26 RX ADMIN — Medication 975 MILLIGRAM(S): at 16:12

## 2018-08-26 RX ADMIN — Medication 30 MILLIGRAM(S): at 05:17

## 2018-08-26 RX ADMIN — HEPARIN SODIUM 5000 UNIT(S): 5000 INJECTION INTRAVENOUS; SUBCUTANEOUS at 20:51

## 2018-08-26 RX ADMIN — Medication 975 MILLIGRAM(S): at 20:49

## 2018-08-26 RX ADMIN — BENZOCAINE AND MENTHOL 1 LOZENGE: 5; 1 LIQUID ORAL at 20:48

## 2018-08-26 RX ADMIN — Medication 30 MILLIGRAM(S): at 10:40

## 2018-08-26 NOTE — PROVIDER CONTACT NOTE (MEDICATION) - SITUATION
Pt. tachycardic (refer to pt. chart). Original  @ , pain meds given to see if there would be relief. Reassessed 2 hours later, pain relieved but HR @ 111.

## 2018-08-26 NOTE — PROGRESS NOTE ADULT - PROBLEM SELECTOR PLAN 1
- increase out of bed and ambulation  - analgesia prn  - continue regular diet  - check CBC  - D/C vidhi (UOP adequate)  - incentive spirometry    DIEGO Reese, PGY2

## 2018-08-26 NOTE — PROVIDER CONTACT NOTE (MEDICATION) - BACKGROUND
primary c/s r/t failed induction and cat II tracing. Gave birth 8/25 @ 1246. . H&H 9.6/28.7 on 08/26/2018 @ 0510.

## 2018-08-26 NOTE — CHART NOTE - NSCHARTNOTEFT_GEN_A_CORE
Patient is 22y  old. P1 s/p PLTCS for NRFHT with IUGR fetus after PROM, under GETA due to inadequate epidural. Patient evaluated for elevated HR of 111 on routine vital signs. She reports feeling her heart beating fast with activity after she walks to bathroom and sits in chair. Does not feel palpitations, CP or SOB at rest. Denies N/V, fever/chills. Reports sore throat that is really bothering her. Denies abdominal or calf pain or breast pain.     Vital Signs Last 24 Hrs  T(C): 37.6 (26 Aug 2018 16:41), Max: 37.6 (26 Aug 2018 02:04)  T(F): 99.6 (26 Aug 2018 16:41), Max: 99.6 (26 Aug 2018 02:04)  HR: 111 (26 Aug 2018 16:41) (76 - 112)  BP: 106/54 (26 Aug 2018 16:41) (105/65 - 139/72)  BP(mean): 82 (25 Aug 2018 20:00) (77 - 83)  RR: 18 (26 Aug 2018 16:41) (15 - 18)  SpO2: 99% (26 Aug 2018 16:41) (98% - 100%)    I&O's Detail    25 Aug 2018 07:01  -  26 Aug 2018 07:00  --------------------------------------------------------  IN:    lactated ringers.: 1125 mL    Other: 1200 mL    oxytocin Infusion: 750 mL  Total IN: 3075 mL    OUT:    Estimated Blood Loss: 500 mL    Indwelling Catheter - Urethral: 4650 mL  Total OUT: 5150 mL    Total NET: -2075 mL      26 Aug 2018 07:01  -  26 Aug 2018 17:59  --------------------------------------------------------  IN:  Total IN: 0 mL    OUT:    Indwelling Catheter - Urethral: 1475 mL    Voided: 600 mL  Total OUT: 2075 mL    Total NET: -2075 mL          Labs:                        9.6    20.87 )-----------( 216      ( 26 Aug 2018 05:10 )             28.7                         10.1   12.82 )-----------( 262      ( 24 Aug 2018 23:50 )             32.1             MEDICATIONS  (STANDING):  acetaminophen   Tablet. 975 milliGRAM(s) Oral every 6 hours  benzocaine 15 mG/menthol 3.6 mG Lozenge 1 Lozenge Oral five times a day  diphtheria/tetanus/pertussis (acellular) Vaccine (ADAcel) 0.5 milliLiter(s) IntraMuscular once  ferrous    sulfate 325 milliGRAM(s) Oral daily  heparin  Injectable 5000 Unit(s) SubCutaneous every 12 hours  ibuprofen  Tablet 600 milliGRAM(s) Oral every 6 hours  ketorolac   Injectable 30 milliGRAM(s) IV Push every 6 hours  lactated ringers. 1000 milliLiter(s) (75 mL/Hr) IV Continuous <Continuous>  lactated ringers. 1000 milliLiter(s) (125 mL/Hr) IV Continuous <Continuous>  oxyCODONE    IR 5 milliGRAM(s) Oral every 3 hours  prenatal multivitamin 1 Tablet(s) Oral daily  tetracaine/benzocaine/butamben Spray 1 Spray(s) Topical three times a day    PE  Gen: NAD, resting, clearing throat often  Chest: tachycardic,  on palpation, clear lungs b/l but shallow breaths  Breast: nonerythematous, nonengourged  Abd: soft, appropriate mild TTP, nondistended, +BS  Ext: nontender b/l, no edema b/l    Management and Follow-up plan :    - likely physiologic response in heart rate, overall patient feels well, afebrile  - continue incentive spirometry and increase use due to shallow breathing  - cepacol and cetacaine spray for throat  - cont to monitor for fever, further tachycardia, other concerns    d/w Dr Andrew Adkins MD R4          -------------------------------------------------------------------------------------------------------------

## 2018-08-27 LAB
BASOPHILS # BLD AUTO: 0.05 K/UL — SIGNIFICANT CHANGE UP (ref 0–0.2)
BASOPHILS NFR BLD AUTO: 0.3 % — SIGNIFICANT CHANGE UP (ref 0–2)
EOSINOPHIL # BLD AUTO: 0.23 K/UL — SIGNIFICANT CHANGE UP (ref 0–0.5)
EOSINOPHIL NFR BLD AUTO: 1.2 % — SIGNIFICANT CHANGE UP (ref 0–6)
HCT VFR BLD CALC: 33.2 % — LOW (ref 34.5–45)
HGB BLD-MCNC: 10.8 G/DL — LOW (ref 11.5–15.5)
IMM GRANULOCYTES # BLD AUTO: 0.14 # — SIGNIFICANT CHANGE UP
IMM GRANULOCYTES NFR BLD AUTO: 0.7 % — SIGNIFICANT CHANGE UP (ref 0–1.5)
LYMPHOCYTES # BLD AUTO: 11 % — LOW (ref 13–44)
LYMPHOCYTES # BLD AUTO: 2.07 K/UL — SIGNIFICANT CHANGE UP (ref 1–3.3)
MCHC RBC-ENTMCNC: 26.4 PG — LOW (ref 27–34)
MCHC RBC-ENTMCNC: 32.5 % — SIGNIFICANT CHANGE UP (ref 32–36)
MCV RBC AUTO: 81.2 FL — SIGNIFICANT CHANGE UP (ref 80–100)
MONOCYTES # BLD AUTO: 1.15 K/UL — HIGH (ref 0–0.9)
MONOCYTES NFR BLD AUTO: 6.1 % — SIGNIFICANT CHANGE UP (ref 2–14)
NEUTROPHILS # BLD AUTO: 15.24 K/UL — HIGH (ref 1.8–7.4)
NEUTROPHILS NFR BLD AUTO: 80.7 % — HIGH (ref 43–77)
NRBC # FLD: 0 — SIGNIFICANT CHANGE UP
PLATELET # BLD AUTO: 248 K/UL — SIGNIFICANT CHANGE UP (ref 150–400)
PMV BLD: 11.2 FL — SIGNIFICANT CHANGE UP (ref 7–13)
RBC # BLD: 4.09 M/UL — SIGNIFICANT CHANGE UP (ref 3.8–5.2)
RBC # FLD: 16.7 % — HIGH (ref 10.3–14.5)
WBC # BLD: 18.88 K/UL — HIGH (ref 3.8–10.5)
WBC # FLD AUTO: 18.88 K/UL — HIGH (ref 3.8–10.5)

## 2018-08-27 RX ORDER — TETANUS TOXOID, REDUCED DIPHTHERIA TOXOID AND ACELLULAR PERTUSSIS VACCINE, ADSORBED 5; 2.5; 8; 8; 2.5 [IU]/.5ML; [IU]/.5ML; UG/.5ML; UG/.5ML; UG/.5ML
0.5 SUSPENSION INTRAMUSCULAR ONCE
Qty: 0 | Refills: 0 | Status: COMPLETED | OUTPATIENT
Start: 2018-08-27 | End: 2018-08-27

## 2018-08-27 RX ADMIN — Medication 600 MILLIGRAM(S): at 04:25

## 2018-08-27 RX ADMIN — Medication 325 MILLIGRAM(S): at 09:33

## 2018-08-27 RX ADMIN — Medication 600 MILLIGRAM(S): at 21:34

## 2018-08-27 RX ADMIN — Medication 600 MILLIGRAM(S): at 22:34

## 2018-08-27 RX ADMIN — Medication 1 TABLET(S): at 13:28

## 2018-08-27 RX ADMIN — Medication 975 MILLIGRAM(S): at 03:21

## 2018-08-27 RX ADMIN — Medication 1 SPRAY(S): at 05:38

## 2018-08-27 RX ADMIN — BENZOCAINE AND MENTHOL 1 LOZENGE: 5; 1 LIQUID ORAL at 21:34

## 2018-08-27 RX ADMIN — Medication 975 MILLIGRAM(S): at 21:34

## 2018-08-27 RX ADMIN — Medication 1 SPRAY(S): at 22:00

## 2018-08-27 RX ADMIN — Medication 975 MILLIGRAM(S): at 15:58

## 2018-08-27 RX ADMIN — Medication 975 MILLIGRAM(S): at 16:55

## 2018-08-27 RX ADMIN — Medication 600 MILLIGRAM(S): at 10:30

## 2018-08-27 RX ADMIN — Medication 600 MILLIGRAM(S): at 15:58

## 2018-08-27 RX ADMIN — HEPARIN SODIUM 5000 UNIT(S): 5000 INJECTION INTRAVENOUS; SUBCUTANEOUS at 09:33

## 2018-08-27 RX ADMIN — TETANUS TOXOID, REDUCED DIPHTHERIA TOXOID AND ACELLULAR PERTUSSIS VACCINE, ADSORBED 0.5 MILLILITER(S): 5; 2.5; 8; 8; 2.5 SUSPENSION INTRAMUSCULAR at 15:58

## 2018-08-27 RX ADMIN — Medication 975 MILLIGRAM(S): at 10:30

## 2018-08-27 RX ADMIN — BENZOCAINE AND MENTHOL 1 LOZENGE: 5; 1 LIQUID ORAL at 09:33

## 2018-08-27 RX ADMIN — Medication 975 MILLIGRAM(S): at 04:25

## 2018-08-27 RX ADMIN — HEPARIN SODIUM 5000 UNIT(S): 5000 INJECTION INTRAVENOUS; SUBCUTANEOUS at 21:20

## 2018-08-27 RX ADMIN — Medication 600 MILLIGRAM(S): at 16:55

## 2018-08-27 RX ADMIN — Medication 975 MILLIGRAM(S): at 09:33

## 2018-08-27 RX ADMIN — Medication 600 MILLIGRAM(S): at 03:21

## 2018-08-27 RX ADMIN — Medication 1 SPRAY(S): at 14:00

## 2018-08-27 RX ADMIN — Medication 500 MILLIGRAM(S): at 13:28

## 2018-08-27 RX ADMIN — BENZOCAINE AND MENTHOL 1 LOZENGE: 5; 1 LIQUID ORAL at 14:55

## 2018-08-27 RX ADMIN — Medication 600 MILLIGRAM(S): at 09:33

## 2018-08-27 RX ADMIN — Medication 975 MILLIGRAM(S): at 22:34

## 2018-08-27 RX ADMIN — Medication 325 MILLIGRAM(S): at 13:28

## 2018-08-27 RX ADMIN — Medication 325 MILLIGRAM(S): at 21:34

## 2018-08-27 NOTE — LACTATION INITIAL EVALUATION - INTERVENTION OUTCOME
verbalizes understanding/reviewed wet and soiled diaper log, feeding cues, feeding on demand and safe sleep practices;  encouraged to ask for assistance with breastfeeding as needed.
verbalizes understanding

## 2018-08-27 NOTE — LACTATION INITIAL EVALUATION - LACTATION INTERVENTIONS
initiate skin to skin/assisted to put baby to rt. breast in football hold position with deep latch and sucking with stimulation;  baby was sleepy after rt. side and no latch achieved for left side.  Pt. encouraged to begin next feeding on left side.
initiate skin to skin/Instructed and assisted with positioning and latch on.  Reviewed feeding log and New Beginnings book.  Encouraged to feed on cue and call for assistance, as needed./initiate hand expression routine

## 2018-08-27 NOTE — LACTATION INITIAL EVALUATION - LATCH: HOLD (POSITIONING) INFANT
minimal assist, teach one side; mother does other, staff holds
full assist (staff holds infant at breast)

## 2018-08-28 VITALS — TEMPERATURE: 98 F

## 2018-08-28 DIAGNOSIS — Z3A.38 38 WEEKS GESTATION OF PREGNANCY: ICD-10-CM

## 2018-08-28 DIAGNOSIS — O36.5930 MATERNAL CARE FOR OTHER KNOWN OR SUSPECTED POOR FETAL GROWTH, THIRD TRIMESTER, NOT APPLICABLE OR UNSPECIFIED: ICD-10-CM

## 2018-08-28 RX ADMIN — Medication 975 MILLIGRAM(S): at 08:05

## 2018-08-28 RX ADMIN — Medication 600 MILLIGRAM(S): at 07:14

## 2018-08-28 RX ADMIN — Medication 325 MILLIGRAM(S): at 09:08

## 2018-08-28 RX ADMIN — Medication 600 MILLIGRAM(S): at 08:06

## 2018-08-28 RX ADMIN — Medication 975 MILLIGRAM(S): at 07:14

## 2018-08-28 RX ADMIN — HEPARIN SODIUM 5000 UNIT(S): 5000 INJECTION INTRAVENOUS; SUBCUTANEOUS at 09:08

## 2018-08-28 NOTE — PROGRESS NOTE ADULT - SUBJECTIVE AND OBJECTIVE BOX
Postpartum Note,  Section  She is a  22y woman who is now post-operative day: 1    Subjective:  The patient feels well, no acute complaints.  Ambulating, tolerating PO diet, denies nausea/vomiting.  Lakhani in place, draining well, and passing gas.  Pain is controlled. Reports normal postpartum bleeding.    Physical exam:    Vital Signs Last 24 Hrs  T(C): 37.6 (26 Aug 2018 02:04), Max: 37.6 (26 Aug 2018 02:04)  T(F): 99.6 (26 Aug 2018 02:04), Max: 99.6 (26 Aug 2018 02:04)  HR: 87 (26 Aug 2018 02:04) (76 - 124)  BP: 119/80 (26 Aug 2018 02:04) (105/50 - 139/72)  BP(mean): 82 (25 Aug 2018 20:00) (63 - 95)  RR: 18 (26 Aug 2018 02:04) (13 - 28)  SpO2: 99% (26 Aug 2018 02:04) (98% - 100%)    Gen: NAD  Abdomen: Soft, nontender, no distension , firm uterine fundus at umbilicus.  Incision: Pfannenstil incision clean, dry, and intact.  Pelvic: Normal lochia noted  Ext: No calf tenderness    LABS:                        10.1   12.82 )-----------( 262      ( 24 Aug 2018 23:50 )             32.1           Allergies    No Known Allergies    Intolerances      MEDICATIONS  (STANDING):  acetaminophen   Tablet. 975 milliGRAM(s) Oral every 6 hours  diphtheria/tetanus/pertussis (acellular) Vaccine (ADAcel) 0.5 milliLiter(s) IntraMuscular once  ferrous    sulfate 325 milliGRAM(s) Oral daily  heparin  Injectable 5000 Unit(s) SubCutaneous every 12 hours  ibuprofen  Tablet 600 milliGRAM(s) Oral every 6 hours  ketorolac   Injectable 30 milliGRAM(s) IV Push every 6 hours  lactated ringers. 1000 milliLiter(s) (75 mL/Hr) IV Continuous <Continuous>  lactated ringers. 1000 milliLiter(s) (125 mL/Hr) IV Continuous <Continuous>  oxyCODONE    IR 5 milliGRAM(s) Oral every 3 hours  prenatal multivitamin 1 Tablet(s) Oral daily    MEDICATIONS  (PRN):  diphenhydrAMINE   Capsule 25 milliGRAM(s) Oral every 6 hours PRN Itching  docusate sodium 100 milliGRAM(s) Oral two times a day PRN Stool Softening  glycerin Suppository - Adult 1 Suppository(s) Rectal at bedtime PRN Constipation  HYDROmorphone  Injectable 1 milliGRAM(s) IV Push every 3 hours PRN Severe Pain  HYDROmorphone  Injectable 0.5 milliGRAM(s) IV Push every 3 hours PRN Moderate Pain  lanolin Ointment 1 Application(s) Topical every 3 hours PRN Sore Nipples  magnesium hydroxide Suspension 30 milliLiter(s) Oral two times a day PRN Constipation  naloxone Injectable 0.1 milliGRAM(s) IV Push every 3 minutes PRN For ANY of the following changes in patient status:  A. RR LESS THAN 10 breaths per minute, B. Oxygen saturation LESS THAN 90%, C. Sedation score of 6  ondansetron Injectable 4 milliGRAM(s) IV Push every 6 hours PRN Nausea  oxyCODONE    IR 5 milliGRAM(s) Oral every 3 hours PRN Mild Pain  oxyCODONE    IR 10 milliGRAM(s) Oral every 3 hours PRN Moderate Pain  oxyCODONE    IR 5 milliGRAM(s) Oral every 4 hours PRN Severe Pain (7 - 10)  simethicone 80 milliGRAM(s) Chew every 4 hours PRN Gas
ANESTHESIA POSTOP CHECK    22y Female POSTOP DAY 1     No COMPLAINTS    NO APPARENT ANESTHESIA COMPLICATIONS
POST OP DAY  1  s/p    SUBJECTIVE:good    PAIN SCALE SCORE: [x] Refer to charted pain scores    THERAPY:  [  ] Spinal morphine   [x ] Epidural morphine   [  ] IV PCA Hydromorphone 1 mg/ml    OBJECTIVE:     SEDATION SCORE:	  [ x ] Alert	    [  ] Drowsy        [  ] Arousable	[  ] Asleep	[  ] Unresponsive    Side Effects:	  [ x ] None	     [  ] Nausea        [  ] Pruritus        [  ] Weakness   [  ] Numbness        ASSESSMENT/ PLAN   [   ] Discontinue         [  ] Continue    [ x ] Change to prn Analgesics as per primary service.    DOCUMENTATION & VERIFICATION OF CURRENT MEDS [ x ] Done    COMMENTS: No Headache.
SUBJECTIVE:    Pain: Controlled    Complaints:  C/O Sore throat    MILESTONES:    Alert and Oriented x 3  [ x ]  Out of bed/ ambulating. [ x ]  Flatus:   Positive [ x ]  Negative [  ]  Bowel movement  [  ] Positive [  ] Negative   Voiding [x  ] Due to void [  ]   Lakhani/Indwelling catheter in place [  ]  Diet: Regular [ x ]  Clears [  ]  NPO [  ]    Infant feeding:  Breast [X  ]   Bottle [  ]  Both [  ]  Feeding related issues and/or concerns:      OBJECTIVE:  T(C): 36.7 (18 @ 06:24), Max: 37.6 (18 @ 16:41)  HR: 96 (18 @ 06:24) (91 - 112)  BP: 121/82 (18 @ 06:24) (106/54 - 125/80)  RR: 18 (18 @ 06:24) (18 - 19)  SpO2: 99% (18 @ 06:24) (97% - 100%)  Wt(kg): --                        10.8   18.88 )-----------( 248      ( 27 Aug 2018 06:51 )             33.2           Blood Type: A Positive    RPR: Negative          MEDICATIONS  (STANDING):  acetaminophen   Tablet. 975 milliGRAM(s) Oral every 6 hours  ascorbic acid 500 milliGRAM(s) Oral daily  benzocaine 15 mG/menthol 3.6 mG Lozenge 1 Lozenge Oral five times a day  diphtheria/tetanus/pertussis (acellular) Vaccine (ADAcel) 0.5 milliLiter(s) IntraMuscular once  ferrous    sulfate 325 milliGRAM(s) Oral three times a day  heparin  Injectable 5000 Unit(s) SubCutaneous every 12 hours  ibuprofen  Tablet 600 milliGRAM(s) Oral every 6 hours  oxyCODONE    IR 5 milliGRAM(s) Oral every 3 hours  prenatal multivitamin 1 Tablet(s) Oral daily  tetracaine/benzocaine/butamben Spray 1 Spray(s) Topical three times a day    MEDICATIONS  (PRN):  diphenhydrAMINE   Capsule 25 milliGRAM(s) Oral every 6 hours PRN Itching  docusate sodium 100 milliGRAM(s) Oral two times a day PRN Stool Softening  glycerin Suppository - Adult 1 Suppository(s) Rectal at bedtime PRN Constipation  lanolin Ointment 1 Application(s) Topical every 3 hours PRN Sore Nipples  magnesium hydroxide Suspension 30 milliLiter(s) Oral two times a day PRN Constipation  oxyCODONE    IR 5 milliGRAM(s) Oral every 4 hours PRN Severe Pain (7 - 10)  simethicone 80 milliGRAM(s) Chew every 4 hours PRN Gas        ASSESSMENT:    22y     G   1   P   1001      PO Day#  2        Delivery: Primary [ X ]    Repeat [  ]                                         Indication of procedure: Abnormal Fetal Status    Condition: Stable    Past Medical History significant for: HPI:      Current Issues:    Breasts:  Soft [x  ]   Engorged [  ]  Nipples:  Abdomen: Soft [ x ]   Distended [  ] Nontender [  ]     Bowel sounds :  Present [  ]  Absent [  ]   Fundus:  Firm [x  ]  Boggy [  ]    Abdominal incision: Clean, Dry and Intact [x  ]  Staples [  ] Steri Strips [  ] Dermabond [ X ] Sutures [  ]    Patient wearing abdominal binder for support.    Vaginal: Lochia:  Heavy [  ]  Moderate [ x ]   Scant [  ]    Extremities: Edema [  ] Negative Kasi's Sign [  ] Nontender Yves  [ x ] Positive pedal pulses [  ]    Other relevant physical exam findings:      PLAN:    Plan: Increase ambulation, analgesia PRN and pain medication protocol standing oxycodone, ibuprofen and acetaminophen.    Diet: Regular diet    Continue routine post-operative and postpartum care. Continue Cepacol and Tetracine/Benzocaine spray as ordered.  WBC 20 --> 18    Discharge Planning [ x ]    For discharge Today  [    ]    Consults:  Social Work [  ]  Lactation [ x ]  Other [         ]
SUBJECTIVE:    Pain: Controlled    Complaints: C/O Fever --> Temp - 98.3    MILESTONES:    Alert and Oriented x 3  [ x ]  Out of bed/ ambulating. [ x ]  Flatus:   Positive [ x ]  Negative [  ]  Bowel movement  [  ] Positive [  ] Negative   Voiding [x  ] Due to void [  ]   Lakhani/Indwelling catheter in place [  ]  Diet: Regular [ x ]  Clears [  ]  NPO [  ]    Infant feeding:  Breast [ X ]   Bottle [  ]  Both [ X ]  Feeding related issues and/or concerns:      OBJECTIVE:  T(C): 36.8 (18 @ 06:06), Max: 36.8 (18 @ 06:06)  HR: 90 (18 @ 06:06) (88 - 106)  BP: 131/87 (18 @ 06:06) (118/73 - 131/87)  RR: 17 (18 @ 06:06) (16 - 18)  SpO2: 100% (18 @ 06:06) (100% - 100%)  Wt(kg): --                        10.8   18.88 )-----------( 248      ( 27 Aug 2018 06:51 )             33.2           Blood Type: A Positive    RPR: Negative          MEDICATIONS  (STANDING):  acetaminophen   Tablet. 975 milliGRAM(s) Oral every 6 hours  ascorbic acid 500 milliGRAM(s) Oral daily  benzocaine 15 mG/menthol 3.6 mG Lozenge 1 Lozenge Oral five times a day  ferrous    sulfate 325 milliGRAM(s) Oral three times a day  heparin  Injectable 5000 Unit(s) SubCutaneous every 12 hours  ibuprofen  Tablet 600 milliGRAM(s) Oral every 6 hours  oxyCODONE    IR 5 milliGRAM(s) Oral every 3 hours  prenatal multivitamin 1 Tablet(s) Oral daily  tetracaine/benzocaine/butamben Spray 1 Spray(s) Topical three times a day    MEDICATIONS  (PRN):  diphenhydrAMINE   Capsule 25 milliGRAM(s) Oral every 6 hours PRN Itching  docusate sodium 100 milliGRAM(s) Oral two times a day PRN Stool Softening  glycerin Suppository - Adult 1 Suppository(s) Rectal at bedtime PRN Constipation  lanolin Ointment 1 Application(s) Topical every 3 hours PRN Sore Nipples  magnesium hydroxide Suspension 30 milliLiter(s) Oral two times a day PRN Constipation  oxyCODONE    IR 5 milliGRAM(s) Oral every 4 hours PRN Severe Pain (7 - 10)  simethicone 80 milliGRAM(s) Chew every 4 hours PRN Gas        ASSESSMENT:    22y     G  1    P 1001       PO Day#  3        Delivery: Primary [ X ]    Repeat [  ]                                         Indication of procedure: Abnormal Fetal Status    Condition: Stable    Past Medical History significant for: HPI:      Current Issues:    Breasts:  Soft [x  ]   Engorged [  ]  Nipples:  Abdomen: Soft [ x ]   Distended [  ] Nontender [  ]     Bowel sounds :  Present [  ]  Absent [  ]   Fundus:  Firm [x  ]  Boggy [  ]    Abdominal incision: Clean, Dry and Intact [x  ]  Staples [  ] Steri Strips [  ] Dermabond [ X ] Sutures [  ]    Patient wearing abdominal binder for support.    Vaginal: Lochia:  Heavy [  ]  Moderate [ x ]   Scant [  ]    Extremities: Edema [  ] Negative Kasi's Sign [  ] Nontender Yves  [ x ] Positive pedal pulses [  ]    Other relevant physical exam findings:      PLAN:    Plan: Increase ambulation, analgesia PRN and pain medication protocol standing oxycodone, ibuprofen and acetaminophen.    Diet: Regular diet    Continue routine post-operative and postpartum care. Patient is afebrile. Stable for discharge.    Discharge Planning [ x ]    For discharge Today  [  X  ]    Consults:  Social Work [  ]  Lactation [ x ]  Other [         ]

## 2018-09-13 DIAGNOSIS — Z3A.39 39 WEEKS GESTATION OF PREGNANCY: ICD-10-CM

## 2018-09-13 DIAGNOSIS — O36.5930 MATERNAL CARE FOR OTHER KNOWN OR SUSPECTED POOR FETAL GROWTH, THIRD TRIMESTER, NOT APPLICABLE OR UNSPECIFIED: ICD-10-CM

## 2020-03-08 ENCOUNTER — TRANSCRIPTION ENCOUNTER (OUTPATIENT)
Age: 25
End: 2020-03-08

## 2020-03-14 ENCOUNTER — TRANSCRIPTION ENCOUNTER (OUTPATIENT)
Age: 25
End: 2020-03-14

## 2020-06-18 ENCOUNTER — TRANSCRIPTION ENCOUNTER (OUTPATIENT)
Age: 25
End: 2020-06-18

## 2021-01-16 ENCOUNTER — TRANSCRIPTION ENCOUNTER (OUTPATIENT)
Age: 26
End: 2021-01-16

## 2021-05-20 ENCOUNTER — TRANSCRIPTION ENCOUNTER (OUTPATIENT)
Age: 26
End: 2021-05-20

## 2021-08-07 NOTE — LACTATION INITIAL EVALUATION - NIPPLE ASSESSMENT (LEFT)
[facility]    ICU Team H&P Note    Name: Maddison Mariano   : 1972   MRN: [id number]   Date: 2021      Subjective:   H&P Note: 2021      Patient is asked to be seen by Luke Adamson NP for acute renal failure requiring RRT, necessitating the need for possible ICU care. Reason for ICU Admission: Acute renal failure     HPI: 50M w/ hx of gastric adenocarcinoma diagnosed in 2021, being seen at [facility] oncology under going chemotherapy, last dose of CHEMO FOLFIRI on 2021, also notable for abdominal lymphadenopathy, HLD, GERD, chronic pain on home opioid medication (oxycodone 10-15 q4h MS contin 15mg BID) presented to [address] [place] [address] for 4 days of nausea/vomiting, patient was noted to have Cr of 19.2, , K 6.6, patient received 6L of NS then started on D51/4 saline. Patient was also given Veltassa for hyperkalemia. On arrival to [facility] ICU patient is alert and awake, tachycardic in the 140's with norepinephrine at 16 mcg/min. Nephrology consulted to initiate CRRT. Pt c/o nausea/abdominal tenderness. POD:* No surgery found *    S/P:     Active Problem List:     Problem List  Date Reviewed: 2013        Codes Class    [facility]) [facility] ICD-10-CM: N17.9  ICD-9-CM: 584.9         Tobacco abuse ICD-10-CM: Z72.0  ICD-9-CM: 305.1     Overview Signed 2013  6:52 PM by López Christianson MD     Cessation encouraged 1-             Primary localized osteoarthrosis, lower leg ICD-10-CM: M17.10  ICD-9-CM: 715.16               Past Medical History:      has a past medical history of Other ill-defined conditions(799.89) (13), Other ill-defined conditions(799.89), and Other ill-defined conditions(799.89) (13). He also has no past medical history of Difficult intubation, Malignant hyperthermia due to anesthesia, Nausea & vomiting, Pseudocholinesterase deficiency, or Unspecified adverse effect of anesthesia.     Past Surgical History:
has a past surgical history that includes hx knee replacement; hx knee arthroscopy (Left, at age 19); hx orthopaedic (Right); and hx orthopaedic (Left, ). Home Medications:     Prior to Admission medications    Not on File       Allergies/Social/Family History:     No Known Allergies   Social History     Tobacco Use    Smoking status: Current Every Day Smoker     Packs/day: 0.50     Years: 20.00     Pack years: 10.00     Types: Cigarettes   Substance Use Topics    Alcohol use: No      No family history on file. Review of Systems:     A comprehensive review of systems was negative except for that written in the HPI. Objective:   Vital Signs:  Visit Vitals  /85 (BP 1 Location: Right upper arm, BP Patient Position: Lying)   Pulse (!) 137   Temp (!) 102.2 °F (39 °C)   Resp 19   Ht 5' 8\" (1.727 m)   Wt 78 kg (171 lb 15.3 oz)   SpO2 99%   BMI 26.15 kg/m²      O2 Device: Nasal cannula Temp (24hrs), Av.2 °F (39 °C), Min:102.2 °F (39 °C), Max:102.2 °F (39 °C)           Intake/Output:   No intake or output data in the 24 hours ending 21 0325    Physical Exam:    General:  Alert, cooperative, well noursished, appears older than stated age    Eyes:  Sclera anicteric. EOMI   Mouth/Throat: Mucous membranes dry   Neck: Supple, HD catheter on L, port on L chest    Lungs:   Clear to auscultation bilaterally, good effort   CV:  Tachycardic, S1, S2, Regular    Abdomen:   Tender to palpation    Extremities: No cyanosis or edema   Skin: Warm, dry   Musculoskeletal: No swelling or deformity   Lines/Devices:  Intact, no erythema, drainage or tenderness   Neuro: Alert and oriented, agitated       LABS AND  DATA: Personally reviewed  No results for input(s): WBC, HGB, HCT, PLT, HGBEXT, HCTEXT, PLTEXT in the last 72 hours. No results for input(s): NA, K, CL, CO2, BUN, CREA, GLU, CA, MG, PHOS in the last 72 hours. No results for input(s): AP, TBIL, TP, ALB, GLOB, AML, LPSE in the last 72 hours.     No lab
exists for component: SGOT, GPT, AMYP  No results for input(s): INR, PTP, APTT, INREXT in the last 72 hours. No results for input(s): PHI, PCO2I, PO2I, FIO2I in the last 72 hours. No results for input(s): CPK, CKMB, TROIQ, BNPP in the last 72 hours. Hemodynamics:   PAP:   CO:     Wedge:   CI:     CVP:    SVR:       PVR:       Ventilator Settings:  Mode Rate Tidal Volume Pressure FiO2 PEEP                    Peak airway pressure:      Minute ventilation:          MEDS: Reviewed    Chest X-Ray: Pending    ECHO pending     Assessment:     ICU Problems:  - Anuric Acute Kidney Injury  - Distributive shock  - Gastric neoplasm on active chemotherapy  - Hyperkalemia  - Leukopenia   - HLD  - Azotemia  - Metabolic acidosis with metabolic alkalosis     ICU Comprehensive Plan of Care:   Plans for this Shift:   1. Pain management with PRN hydromorphone, avoid sedatives  2. Pt protecting airway saturation >92%  3. Continue norepinephrine, add vasopressin for distributive shock  4. Start broad spectrum abx given immunocompromised state with fever and shock  5. Trend labs Q6h  6. Consult oncology   7. Consult nephrology for initiation of CRRT  8. Start Bicarb gtt   9. Obtain Serum uric acid level, consider rasburicase after oncology consult and uric acid level, low risk for true TLS with solid organ tumor however if large tumor burden with dehydration is possible. 10. Monitor UOP  11. Sliding Scale Insulin PRN   12. Obtain ECHO, patient had reduced EF   13. Palliative care consult     Multidisciplinary Rounds Completed: Yes    ABCDEF Bundle/Checklist  Pain Medications: Dilaudid  Target RASS: 0 - Alert & Calm - Spontaneously pays attention to caregiver  Sedation Medications: None  CAM-ICU:  Negative  Mobility: Bedrest  PT/OT: N/A   Restraints: None needed at this time  Discussed Plan of Care (goals of care):  Yes  Addressed Code Status: Full Code    CARDIOVASCULAR  Cardiac Gtts: Norepinephrine and Vasopressin  SBP Goal of: >
90 mmHg  MAP Goal of: > 65 mmHg  Transfusion Trigger (Hgb): <7 g/dL    RESPIRATORY  Vent Goals:   Head of bed > 30 degrees  DVT Prophylaxis (if no, list reason): SCD's or Sequential Compression Device   SPO2 Goal: > 92%  Pulmonary toilet: Duo-Nebs     GI/  Ross Catheter Present: No  GI Prophylaxis: Not at this time   Nutrition: No NPO  IVFs: Bicarb gtt @100cc/hr   Bowel Movement: No  Bowel Regimen: Senna and Docusate (Colace)  Insulin: Sliding Scale PRN    ANTIBIOTICS  Antibiotics:  Cefepime  Vancomycin    T/L/D  Tubes: None  Lines: Peripheral IV and Trialysis Catheter, Port  Drains: None    SPECIAL EQUIPMENT  CRRT    DISPOSITION  Stay in ICU    CRITICAL CARE CONSULTANT NOTE  I had a face to face encounter with the patient, reviewed and interpreted patient data including clinical events, labs, images, vital signs, I/O's, and examined patient. I have discussed the case and the plan and management of the patient's care with the consulting services, the bedside nurses and the respiratory therapist.      NOTE OF PERSONAL INVOLVEMENT IN CARE   This patient has a high probability of imminent, clinically significant deterioration, which requires the highest level of preparedness to intervene urgently. I participated in the decision-making and personally managed or directed the management of the following life and organ supporting interventions that required my frequent assessment to treat or prevent imminent deterioration. I personally spent 95 minutes of critical care time. This is time spent at this critically ill patient's bedside actively involved in patient care as well as the coordination of care and discussions with the patient's family. This does not include any procedural time which has been billed separately.     Juanito Randolph MD  Staff [address]  8/7/2021
medium/normal
medium/short/pink

## 2021-09-10 ENCOUNTER — APPOINTMENT (OUTPATIENT)
Dept: ANTEPARTUM | Facility: CLINIC | Age: 26
End: 2021-09-10
Payer: MEDICAID

## 2021-09-10 ENCOUNTER — ASOB RESULT (OUTPATIENT)
Age: 26
End: 2021-09-10

## 2021-09-10 PROCEDURE — 76801 OB US < 14 WKS SINGLE FETUS: CPT

## 2021-09-10 PROCEDURE — 36416 COLLJ CAPILLARY BLOOD SPEC: CPT

## 2021-09-10 PROCEDURE — 76813 OB US NUCHAL MEAS 1 GEST: CPT

## 2021-11-05 ENCOUNTER — APPOINTMENT (OUTPATIENT)
Dept: ANTEPARTUM | Facility: CLINIC | Age: 26
End: 2021-11-05
Payer: MEDICAID

## 2021-11-05 ENCOUNTER — ASOB RESULT (OUTPATIENT)
Age: 26
End: 2021-11-05

## 2021-11-05 ENCOUNTER — APPOINTMENT (OUTPATIENT)
Dept: ANTEPARTUM | Facility: CLINIC | Age: 26
End: 2021-11-05

## 2021-11-05 PROCEDURE — 76805 OB US >/= 14 WKS SNGL FETUS: CPT

## 2021-11-15 ENCOUNTER — ASOB RESULT (OUTPATIENT)
Age: 26
End: 2021-11-15

## 2021-11-15 ENCOUNTER — APPOINTMENT (OUTPATIENT)
Dept: ANTEPARTUM | Facility: CLINIC | Age: 26
End: 2021-11-15
Payer: MEDICAID

## 2021-11-15 PROCEDURE — 76816 OB US FOLLOW-UP PER FETUS: CPT

## 2022-01-11 ENCOUNTER — ASOB RESULT (OUTPATIENT)
Age: 27
End: 2022-01-11

## 2022-01-11 ENCOUNTER — APPOINTMENT (OUTPATIENT)
Dept: ANTEPARTUM | Facility: CLINIC | Age: 27
End: 2022-01-11
Payer: MEDICAID

## 2022-01-11 PROCEDURE — 76819 FETAL BIOPHYS PROFIL W/O NST: CPT

## 2022-01-11 PROCEDURE — 76820 UMBILICAL ARTERY ECHO: CPT

## 2022-01-11 PROCEDURE — 76816 OB US FOLLOW-UP PER FETUS: CPT

## 2022-01-19 NOTE — DISCHARGE NOTE OB - FUNCTIONAL SCREEN CURRENT LEVEL: DRESSING, MLM
Education / Communication  Ongoing As Appropriate      5. Keep Patients / Families Aware of Delays As Appropriate     6. Reinforce Discharge Teaching / Post  Procedure  Instructions (PRN)          PAIN MANAGEMENT  INTERDISCIPLINARY   Goal:Patient Return to Pre Procedure Comfort  Interventions     1. Assess Baseline  Pain Level  and (PRN)     2. Intra Procedure ;  Evaluation & Assessment Of  Pain  is Ongoing     3. Post procedure; Assess Pain Level Once Awake/ Prior  To Discharge     2. Administer Analgesics as Ordered (PRN)      3. Assess Effectiveness of Pain Management (PRN)       Re-Assess Patient   after all Interventions. Assess Pain Level 30 - 60 Minutes After Pain Management Intervention.      4. Provide Discharge Teaching (0) independent

## 2022-02-04 ENCOUNTER — ASOB RESULT (OUTPATIENT)
Age: 27
End: 2022-02-04

## 2022-02-04 ENCOUNTER — APPOINTMENT (OUTPATIENT)
Dept: ANTEPARTUM | Facility: CLINIC | Age: 27
End: 2022-02-04
Payer: MEDICAID

## 2022-02-04 PROCEDURE — 76816 OB US FOLLOW-UP PER FETUS: CPT

## 2022-02-04 PROCEDURE — 76819 FETAL BIOPHYS PROFIL W/O NST: CPT

## 2022-02-08 ENCOUNTER — ASOB RESULT (OUTPATIENT)
Age: 27
End: 2022-02-08

## 2022-02-08 ENCOUNTER — APPOINTMENT (OUTPATIENT)
Dept: ANTEPARTUM | Facility: CLINIC | Age: 27
End: 2022-02-08
Payer: MEDICAID

## 2022-02-08 ENCOUNTER — OUTPATIENT (OUTPATIENT)
Dept: OUTPATIENT SERVICES | Facility: HOSPITAL | Age: 27
LOS: 1 days | End: 2022-02-08

## 2022-02-08 DIAGNOSIS — O26.899 OTHER SPECIFIED PREGNANCY RELATED CONDITIONS, UNSPECIFIED TRIMESTER: ICD-10-CM

## 2022-02-08 DIAGNOSIS — Z3A.00 WEEKS OF GESTATION OF PREGNANCY NOT SPECIFIED: ICD-10-CM

## 2022-02-08 PROCEDURE — 76818 FETAL BIOPHYS PROFILE W/NST: CPT

## 2022-02-08 PROCEDURE — 76820 UMBILICAL ARTERY ECHO: CPT

## 2022-02-11 ENCOUNTER — APPOINTMENT (OUTPATIENT)
Dept: ANTEPARTUM | Facility: CLINIC | Age: 27
End: 2022-02-11

## 2022-02-14 ENCOUNTER — APPOINTMENT (OUTPATIENT)
Dept: ANTEPARTUM | Facility: CLINIC | Age: 27
End: 2022-02-14
Payer: MEDICAID

## 2022-02-14 ENCOUNTER — ASOB RESULT (OUTPATIENT)
Age: 27
End: 2022-02-14

## 2022-02-14 PROCEDURE — 76818 FETAL BIOPHYS PROFILE W/NST: CPT

## 2022-02-14 PROCEDURE — 76820 UMBILICAL ARTERY ECHO: CPT

## 2022-02-18 ENCOUNTER — OUTPATIENT (OUTPATIENT)
Dept: OUTPATIENT SERVICES | Facility: HOSPITAL | Age: 27
LOS: 1 days | End: 2022-02-18

## 2022-02-18 ENCOUNTER — APPOINTMENT (OUTPATIENT)
Dept: ANTEPARTUM | Facility: CLINIC | Age: 27
End: 2022-02-18
Payer: MEDICAID

## 2022-02-18 ENCOUNTER — ASOB RESULT (OUTPATIENT)
Age: 27
End: 2022-02-18

## 2022-02-18 VITALS
DIASTOLIC BLOOD PRESSURE: 75 MMHG | RESPIRATION RATE: 14 BRPM | HEIGHT: 61 IN | OXYGEN SATURATION: 100 % | SYSTOLIC BLOOD PRESSURE: 107 MMHG | TEMPERATURE: 99 F | HEART RATE: 91 BPM | WEIGHT: 143.96 LBS

## 2022-02-18 DIAGNOSIS — Z01.812 ENCOUNTER FOR PREPROCEDURAL LABORATORY EXAMINATION: ICD-10-CM

## 2022-02-18 DIAGNOSIS — Z98.891 HISTORY OF UTERINE SCAR FROM PREVIOUS SURGERY: Chronic | ICD-10-CM

## 2022-02-18 LAB
APPEARANCE UR: CLEAR — SIGNIFICANT CHANGE UP
BILIRUB UR-MCNC: NEGATIVE — SIGNIFICANT CHANGE UP
BLD GP AB SCN SERPL QL: NEGATIVE — SIGNIFICANT CHANGE UP
COLOR SPEC: SIGNIFICANT CHANGE UP
DIFF PNL FLD: NEGATIVE — SIGNIFICANT CHANGE UP
GLUCOSE UR QL: NEGATIVE — SIGNIFICANT CHANGE UP
HCT VFR BLD CALC: 33.7 % — LOW (ref 34.5–45)
HGB BLD-MCNC: 11 G/DL — LOW (ref 11.5–15.5)
KETONES UR-MCNC: NEGATIVE — SIGNIFICANT CHANGE UP
LEUKOCYTE ESTERASE UR-ACNC: ABNORMAL
MCHC RBC-ENTMCNC: 25.5 PG — LOW (ref 27–34)
MCHC RBC-ENTMCNC: 32.6 GM/DL — SIGNIFICANT CHANGE UP (ref 32–36)
MCV RBC AUTO: 78.2 FL — LOW (ref 80–100)
NITRITE UR-MCNC: NEGATIVE — SIGNIFICANT CHANGE UP
NRBC # BLD: 0 /100 WBCS — SIGNIFICANT CHANGE UP
NRBC # FLD: 0 K/UL — SIGNIFICANT CHANGE UP
PH UR: 7 — SIGNIFICANT CHANGE UP (ref 5–8)
PLATELET # BLD AUTO: 287 K/UL — SIGNIFICANT CHANGE UP (ref 150–400)
PROT UR-MCNC: NEGATIVE — SIGNIFICANT CHANGE UP
RBC # BLD: 4.31 M/UL — SIGNIFICANT CHANGE UP (ref 3.8–5.2)
RBC # FLD: 16.6 % — HIGH (ref 10.3–14.5)
RH IG SCN BLD-IMP: POSITIVE — SIGNIFICANT CHANGE UP
SP GR SPEC: 1.01 — SIGNIFICANT CHANGE UP (ref 1–1.05)
UROBILINOGEN FLD QL: SIGNIFICANT CHANGE UP
WBC # BLD: 13.54 K/UL — HIGH (ref 3.8–10.5)
WBC # FLD AUTO: 13.54 K/UL — HIGH (ref 3.8–10.5)

## 2022-02-18 PROCEDURE — 76816 OB US FOLLOW-UP PER FETUS: CPT

## 2022-02-18 PROCEDURE — 76819 FETAL BIOPHYS PROFIL W/O NST: CPT

## 2022-02-18 PROCEDURE — 76820 UMBILICAL ARTERY ECHO: CPT

## 2022-02-18 NOTE — OB PST NOTE - NSANTHGENDERRD_ENT_A_CORE
[facility] [facility]  Phone [phone]   Fax [phone]      telephone VISIT: 1/4/2021    Wolfgang Jones is a 59 y.o. female who presents today for her medical conditions/complaints as noted below. Mitch Fuchs isc/o of Urinary Tract Infection        :     Urinary Tract Infection   This is a new problem. The current episode started yesterday. The problem occurs every urination. The problem has been waxing and waning. The quality of the pain is described as aching and burning. The pain is moderate. There has been no fever. Associated symptoms include frequency, hesitancy and urgency. Pertinent negatives include no possible pregnancy or vomiting. Insomnia  She takes seroquel nightly and does well with this. She also has bipolar 1 disorder and she is stable with this.      Past Medical History:   Diagnosis Date    Anxiety     Arthritis     Bipolar 1 disorder ([facility]     CAD (coronary artery disease)     CHF (congestive heart failure) ([facility])     Chronic kidney disease (CKD) stage G3b/A2, moderately decreased glomerular filtration rate (GFR) between 30-44 mL/min/1.73 square meter and albuminuria creatinine ratio between  mg/g 11/21/2016    Depression     DM (diabetes mellitus) ([facility])     GERD (gastroesophageal reflux disease)     History of blood transfusion     History of suicidal ideation     Hyperlipidemia     Hypertension     Hypothyroidism     Insomnia     Kidney disease     stage 3 failure    MI, old 9/17/2005    Obesity     Polyarticular arthritis     Type II or unspecified type diabetes mellitus without mention of complication, not stated as uncontrolled       Past Surgical History:   Procedure Laterality Date    ABDOMINOPLASTY      ANGIOPLASTY  1/20/05    stent placement to LAD and diagonal with normal left vent function    BREAST REDUCTION SURGERY      CARDIAC CATHETERIZATION  2/07/05, 9/17/05    see report  Stents x3    CARDIAC CATHETERIZATION  3/23/15  JDT    EF 50%    CARPAL TUNNEL RELEASE       SECTION      x2    CHOLECYSTECTOMY      GASTRIC BYPASS SURGERY      HERNIA REPAIR      umbilical with mesh    INTRACAPSULAR CATARACT EXTRACTION Left 2016    LT EYE CATARACT EMULSIFICATION IOL IMPLANT performed by Philippe Birmingham MD at [facility] OPEN TREATMENT BIMALLEOLAR ANKLE FRACTURE Right 2018    ORIF RIGHT BIMALLEOLAR ANKLE FRACTURE, RIGHT WRIST CAST REMOVAL performed by Bee Amaro MD at [address] [facility] OPEN TREATMENT BIMALLEOLAR ANKLE FRACTURE Right 2018    ANKLE OPEN REDUCTION INTERNAL FIXATION performed by Bee Amaro MD at [address]    TOTAL KNEE ARTHROPLASTY Left 2017    KNEE TOTAL ARTHROPLASTY performed by Jelena Fuller DO at [facility] OR       Family History   Problem Relation Age of Onset    Depression Mother     Heart Attack Mother     High Blood Pressure Mother     Kidney Disease Father     Alcohol Abuse Father     Depression Father     Heart Attack Father     High Blood Pressure Father     Kidney Disease Brother     Heart Disease Other     Depression Sister        Social History     Tobacco Use    Smoking status: Never Smoker    Smokeless tobacco: Former User     Types: Snuff   Substance Use Topics    Alcohol use: No      Current Outpatient Medications   Medication Sig Dispense Refill    QUEtiapine (SEROQUEL) 200 MG tablet Take 1 tablet by mouth nightly 90 tablet 3    phenazopyridine (PYRIDIUM) 200 MG tablet Take 1 tablet by mouth 3 times daily as needed for Pain 12 tablet 1    sulfamethoxazole-trimethoprim (BACTRIM DS) 800-160 MG per tablet Take 1 tablet by mouth 2 times daily for 10 days 20 tablet 0    ondansetron (ZOFRAN-ODT) 4 MG disintegrating tablet Take 1 tablet by mouth 3 times daily as needed for Nausea or Vomiting 21 tablet 2    HYDROcodone-acetaminophen (NORCO) 5-325 MG per tablet Take 1 tablet by mouth 2 times daily as needed for Pain for up to 30 days. May fill 12- 60 tablet 0    budesonide-formoterol (SYMBICORT) 160-4.5 MCG/ACT AERO Inhale 2 puffs into the lungs 2 times daily 3 Inhaler 1    albuterol sulfate HFA (VENTOLIN HFA) 108 (90 Base) MCG/ACT inhaler Inhale 2 puffs into the lungs 4 times daily as needed for Wheezing 1 Inhaler 5    valsartan (DIOVAN) 320 MG tablet Take 1 tablet by mouth daily 90 tablet 3    pregabalin (LYRICA) 150 MG capsule Take 1 capsule by mouth 2 times daily for 90 days. 60 capsule 2    Dulaglutide (TRULICITY) 1.5 IT/8.9IL SOPN Inject 1.5 mg into the skin every 7 days 4 pen 11    amitriptyline (ELAVIL) 100 MG tablet Take 2 tablets by mouth nightly 180 tablet 3    insulin aspart (NOVOLOG FLEXPEN) 100 UNIT/ML injection pen Inject into the skin 3 times daily (before meals) Sliding scale      tiZANidine (ZANAFLEX) 4 MG tablet Take 1 tablet by mouth every 8 hours as needed (spasms) 60 tablet 0    amLODIPine (NORVASC) 5 MG tablet Take 1 tablet by mouth daily (Patient taking differently: Take 5 mg by mouth nightly ) 30 tablet 11    atorvastatin (LIPITOR) 40 MG tablet Take 1 tablet by mouth nightly 90 tablet 3    blood glucose test strips (FREESTYLE LITE) strip 1 each by In Vitro route 4 times daily As needed. 100 each 3    Lancets MISC 1 each by Does not apply route daily 100 each 3    metoprolol succinate (TOPROL XL) 50 MG extended release tablet Take 1 tablet by mouth daily 90 tablet 3    DULoxetine (CYMBALTA) 60 MG extended release capsule Take 1 capsule by mouth 2 times daily 180 capsule 3    nitroGLYCERIN (NITROSTAT) 0.4 MG SL tablet Place 1 tablet under the tongue every 5 minutes as needed for Chest pain up to max of 3 total doses.  If no relief after 1 dose, call 911. 25 tablet 3    fluticasone (FLONASE) 50 MCG/ACT nasal spray 1 spray by Nasal route daily 3 Bottle 3    levothyroxine (SYNTHROID) 88 MCG tablet Take 1 tablet by mouth Daily Tube feeding (TF) interaction, obtain physician order to manage, recommend holding TF for 30 minutes before and after dose. 90 tablet 3    pantoprazole (PROTONIX) 40 MG tablet Take 1 tablet by mouth daily 90 tablet 3    Insulin Pen Needle ([employer] PEN NEEDLES 31G) 31G X 8 MM MISC 1 each by Does not apply route daily 100 each 3    aspirin 81 MG tablet Take 1 tablet by mouth daily 90 tablet 3    Insulin Pen Needle 31G X 8 MM MISC 1 each by Does not apply route 4 times daily 200 each 11    Blood Glucose Monitoring Suppl (FREESTYLE LITE) JEZ 1 Device by Does not apply route 4 times daily 1 Device 0     No current facility-administered medications for this visit. Allergies   Allergen Reactions    Ciprofloxacin Hives     HTN    Dilaudid [Hydromorphone Hcl] Other (See Comments)     Takes pt out of her mind. hallunications. Pt stateshas taken this hospitalization and has not had any problems    Keflex [Cephalexin] Hives    Nitrofuran Derivatives Hives    Pcn [Penicillins] Hives    Cefdinir Rash       Health Maintenance   Topic Date Due    Hepatitis B vaccine (1 of 3 - Risk 3-dose series) 05/12/1980    DTaP/Tdap/Td vaccine (1 - Tdap) 05/12/1980    Diabetic retinal exam  02/27/2019    Diabetic foot exam  09/11/2019    Lipid screen  06/18/2021    Breast cancer screen  08/02/2021    A1C test (Diabetic or Prediabetic)  10/01/2021    TSH testing  10/01/2021    Potassium monitoring  10/01/2021    Creatinine monitoring  10/01/2021    Annual Wellness Visit (AWV)  12/19/2021    Colon cancer screen fecal DNA test (Cologuard)  10/07/2023    Flu vaccine  Completed    Shingles Vaccine  Completed    Pneumococcal 0-64 years Vaccine  Completed    Hepatitis C screen  Completed    HIV screen  Completed    Hepatitis A vaccine  Aged Out    Hib vaccine  Aged Out    Meningococcal (ACWY) vaccine  Aged Out        :     Review of Systems   Constitutional: Negative for appetite change and unexpected weight change.    HENT: Negative for congestion, rhinorrhea, sore throat and trouble swallowing. Eyes: Negative for discharge and redness. Respiratory: Negative for cough and wheezing. Cardiovascular: Negative for chest pain. Gastrointestinal: Negative for abdominal pain, blood in stool, constipation, diarrhea and vomiting. Genitourinary: Positive for dysuria, frequency, hesitancy and urgency. Skin: Negative for rash. Neurological: Negative for weakness. Hematological: Negative for adenopathy.       :     Physical Exam  Pulmonary:      Effort: Pulmonary effort is normal.   Neurological:      Mental Status: She is alert and oriented to person, place, and time. Psychiatric:         Mood and Affect: Mood normal.     limited due to telephone visit. There were no vitals taken for this visit.    :        ICD-10-CM    1. Acute cystitis without hematuria  N30.00 phenazopyridine (PYRIDIUM) 200 MG tablet     sulfamethoxazole-trimethoprim (BACTRIM DS) 800-160 MG per tablet   2. Recurrent major depressive disorder, in partial remission ([facility])  F33.41 QUEtiapine (SEROQUEL) 200 MG tablet   3. Psychophysiological insomnia  F51.04 QUEtiapine (SEROQUEL) 200 MG tablet   4. Bipolar disorder, in full remission, most [facility] episode [facility]  F31.78 QUEtiapine (SEROQUEL) 200 MG tablet       :      Return if symptoms worsen or fail to improve. No orders of the defined types were placed in this encounter. Orders Placed This Encounter   Medications    QUEtiapine (SEROQUEL) 200 MG tablet     Sig: Take 1 tablet by mouth nightly     Dispense:  90 tablet     Refill:  3     Ok for refill today 9/4/2020    phenazopyridine (PYRIDIUM) 200 MG tablet     Sig: Take 1 tablet by mouth 3 times daily as needed for Pain     Dispense:  12 tablet     Refill:  1    sulfamethoxazole-trimethoprim (BACTRIM DS) 800-160 MG per tablet     Sig: Take 1 tablet by mouth 2 times daily for 10 days     Dispense:  20 tablet     Refill:  0         Discussed use, benefit, and side effectsof prescribed medications. All patient questions answered. Pt voiced understanding. Reviewed health maintenance. .  Patient agreed with treatment plan. Follow up asdirected. There are no Patient Instructions on file for this visit. Electronically signed by TRESSA Mathews on1/4/2021 at 9:24 AM    Due to patients co-morbidities and risk for potential exposure of COVID 19 pandemic, Telehealth was initiated. Verbal consent was given to conduct a teleheath visit. Provider performed history of present illness and review of systems. Diagnosis and treatment plan was discussed with patient. Pharmacy of choice was reviewed along with past medical history, medication allergies, and current medications. Education provided to patient or patient parents/guardian with current illness diagnosis as well as when to seek additional healthcare due to changing or for worsening symptoms. Patient voiced understanding. Services were provided through a telephone encounter to substitute for in-person clinic visit. Patient was located at their individual home and provider was located at the office of [facility].     Patient identification was verified at the start of the visit: Yes    Total time spent on this encounter: 12 min No

## 2022-02-18 NOTE — OB PST NOTE - NSHPPHYSICALEXAM_GEN_ALL_CORE

## 2022-02-18 NOTE — OB PST NOTE - HISTORY OF PRESENT ILLNESS
25 y/o female presents for preop eval for repeat .   Pt denies vaginal bleeding/spotting/leakage and verbalized positive fetal movement felt approx 30 mins ago"

## 2022-02-18 NOTE — OB PST NOTE - NSICDXPASTSURGICALHX_GEN_ALL_CORE_FT
PAST SURGICAL HISTORY:  H/O  section due to cord wrapped around neck 2018 - healthy Girl 6lbs 4oz

## 2022-02-18 NOTE — OB PST NOTE - PROBLEM SELECTOR PLAN 1
Scheduled for repeat cesaren section on 2/23/22  Written & verbal preop instructions, & surgical soap given  Pt verbalized good understanding.  Teach back done on surgical soap instructions.

## 2022-02-22 ENCOUNTER — APPOINTMENT (OUTPATIENT)
Dept: ANTEPARTUM | Facility: CLINIC | Age: 27
End: 2022-02-22

## 2022-02-22 ENCOUNTER — TRANSCRIPTION ENCOUNTER (OUTPATIENT)
Age: 27
End: 2022-02-22

## 2022-02-23 ENCOUNTER — RESULT REVIEW (OUTPATIENT)
Age: 27
End: 2022-02-23

## 2022-02-23 ENCOUNTER — INPATIENT (INPATIENT)
Facility: HOSPITAL | Age: 27
LOS: 1 days | Discharge: HOME | End: 2022-02-25
Attending: OBSTETRICS & GYNECOLOGY | Admitting: OBSTETRICS & GYNECOLOGY
Payer: MEDICAID

## 2022-02-23 ENCOUNTER — TRANSCRIPTION ENCOUNTER (OUTPATIENT)
Age: 27
End: 2022-02-23

## 2022-02-23 VITALS
SYSTOLIC BLOOD PRESSURE: 119 MMHG | DIASTOLIC BLOOD PRESSURE: 76 MMHG | TEMPERATURE: 98 F | HEART RATE: 105 BPM | HEIGHT: 61 IN | WEIGHT: 143.96 LBS | RESPIRATION RATE: 16 BRPM

## 2022-02-23 DIAGNOSIS — Z98.891 HISTORY OF UTERINE SCAR FROM PREVIOUS SURGERY: Chronic | ICD-10-CM

## 2022-02-23 LAB
BASOPHILS # BLD AUTO: 0.06 K/UL — SIGNIFICANT CHANGE UP (ref 0–0.2)
BASOPHILS NFR BLD AUTO: 0.4 % — SIGNIFICANT CHANGE UP (ref 0–2)
BLD GP AB SCN SERPL QL: NEGATIVE — SIGNIFICANT CHANGE UP
COVID-19 SPIKE DOMAIN AB INTERP: POSITIVE
COVID-19 SPIKE DOMAIN ANTIBODY RESULT: >250 U/ML — HIGH
EOSINOPHIL # BLD AUTO: 0.13 K/UL — SIGNIFICANT CHANGE UP (ref 0–0.5)
EOSINOPHIL NFR BLD AUTO: 0.9 % — SIGNIFICANT CHANGE UP (ref 0–6)
HBV SURFACE AG SERPL QL IA: SIGNIFICANT CHANGE UP
HCT VFR BLD CALC: 35.1 % — SIGNIFICANT CHANGE UP (ref 34.5–45)
HGB BLD-MCNC: 11.2 G/DL — LOW (ref 11.5–15.5)
IANC: 10.64 K/UL — HIGH (ref 1.5–8.5)
IMM GRANULOCYTES NFR BLD AUTO: 1.7 % — HIGH (ref 0–1.5)
LYMPHOCYTES # BLD AUTO: 18.3 % — SIGNIFICANT CHANGE UP (ref 13–44)
LYMPHOCYTES # BLD AUTO: 2.7 K/UL — SIGNIFICANT CHANGE UP (ref 1–3.3)
MCHC RBC-ENTMCNC: 24.7 PG — LOW (ref 27–34)
MCHC RBC-ENTMCNC: 31.9 GM/DL — LOW (ref 32–36)
MCV RBC AUTO: 77.3 FL — LOW (ref 80–100)
MONOCYTES # BLD AUTO: 0.97 K/UL — HIGH (ref 0–0.9)
MONOCYTES NFR BLD AUTO: 6.6 % — SIGNIFICANT CHANGE UP (ref 2–14)
NEUTROPHILS # BLD AUTO: 10.64 K/UL — HIGH (ref 1.8–7.4)
NEUTROPHILS NFR BLD AUTO: 72.1 % — SIGNIFICANT CHANGE UP (ref 43–77)
NRBC # BLD: 0 /100 WBCS — SIGNIFICANT CHANGE UP
NRBC # FLD: 0 K/UL — SIGNIFICANT CHANGE UP
PLATELET # BLD AUTO: 284 K/UL — SIGNIFICANT CHANGE UP (ref 150–400)
RBC # BLD: 4.54 M/UL — SIGNIFICANT CHANGE UP (ref 3.8–5.2)
RBC # FLD: 17.1 % — HIGH (ref 10.3–14.5)
RH IG SCN BLD-IMP: POSITIVE — SIGNIFICANT CHANGE UP
SARS-COV-2 IGG+IGM SERPL QL IA: >250 U/ML — HIGH
SARS-COV-2 IGG+IGM SERPL QL IA: POSITIVE
T PALLIDUM AB TITR SER: NEGATIVE — SIGNIFICANT CHANGE UP
WBC # BLD: 14.75 K/UL — HIGH (ref 3.8–10.5)
WBC # FLD AUTO: 14.75 K/UL — HIGH (ref 3.8–10.5)

## 2022-02-23 PROCEDURE — 59025 FETAL NON-STRESS TEST: CPT | Mod: 26

## 2022-02-23 PROCEDURE — 59514 CESAREAN DELIVERY ONLY: CPT | Mod: AS,U8

## 2022-02-23 PROCEDURE — 88307 TISSUE EXAM BY PATHOLOGIST: CPT | Mod: 26

## 2022-02-23 RX ORDER — ACETAMINOPHEN 500 MG
975 TABLET ORAL
Refills: 0 | Status: DISCONTINUED | OUTPATIENT
Start: 2022-02-23 | End: 2022-02-25

## 2022-02-23 RX ORDER — OXYTOCIN 10 UNIT/ML
333.33 VIAL (ML) INJECTION
Qty: 20 | Refills: 0 | Status: DISCONTINUED | OUTPATIENT
Start: 2022-02-23 | End: 2022-02-24

## 2022-02-23 RX ORDER — MAGNESIUM HYDROXIDE 400 MG/1
30 TABLET, CHEWABLE ORAL
Refills: 0 | Status: DISCONTINUED | OUTPATIENT
Start: 2022-02-23 | End: 2022-02-25

## 2022-02-23 RX ORDER — OXYCODONE HYDROCHLORIDE 5 MG/1
5 TABLET ORAL
Refills: 0 | Status: DISCONTINUED | OUTPATIENT
Start: 2022-02-23 | End: 2022-02-25

## 2022-02-23 RX ORDER — SODIUM CHLORIDE 9 MG/ML
1000 INJECTION, SOLUTION INTRAVENOUS ONCE
Refills: 0 | Status: COMPLETED | OUTPATIENT
Start: 2022-02-23 | End: 2022-02-23

## 2022-02-23 RX ORDER — DIPHENHYDRAMINE HCL 50 MG
25 CAPSULE ORAL EVERY 6 HOURS
Refills: 0 | Status: DISCONTINUED | OUTPATIENT
Start: 2022-02-23 | End: 2022-02-25

## 2022-02-23 RX ORDER — ONDANSETRON 8 MG/1
4 TABLET, FILM COATED ORAL EVERY 6 HOURS
Refills: 0 | Status: DISCONTINUED | OUTPATIENT
Start: 2022-02-23 | End: 2022-02-24

## 2022-02-23 RX ORDER — SODIUM CHLORIDE 9 MG/ML
1000 INJECTION, SOLUTION INTRAVENOUS
Refills: 0 | Status: DISCONTINUED | OUTPATIENT
Start: 2022-02-23 | End: 2022-02-24

## 2022-02-23 RX ORDER — FAMOTIDINE 10 MG/ML
20 INJECTION INTRAVENOUS ONCE
Refills: 0 | Status: COMPLETED | OUTPATIENT
Start: 2022-02-23 | End: 2022-02-23

## 2022-02-23 RX ORDER — DEXAMETHASONE 0.5 MG/5ML
4 ELIXIR ORAL EVERY 6 HOURS
Refills: 0 | Status: DISCONTINUED | OUTPATIENT
Start: 2022-02-23 | End: 2022-02-24

## 2022-02-23 RX ORDER — IBUPROFEN 200 MG
600 TABLET ORAL EVERY 6 HOURS
Refills: 0 | Status: COMPLETED | OUTPATIENT
Start: 2022-02-23 | End: 2023-01-22

## 2022-02-23 RX ORDER — LANOLIN
1 OINTMENT (GRAM) TOPICAL EVERY 6 HOURS
Refills: 0 | Status: DISCONTINUED | OUTPATIENT
Start: 2022-02-23 | End: 2022-02-25

## 2022-02-23 RX ORDER — KETOROLAC TROMETHAMINE 30 MG/ML
30 SYRINGE (ML) INJECTION EVERY 6 HOURS
Refills: 0 | Status: DISCONTINUED | OUTPATIENT
Start: 2022-02-23 | End: 2022-02-24

## 2022-02-23 RX ORDER — NALOXONE HYDROCHLORIDE 4 MG/.1ML
0.1 SPRAY NASAL
Refills: 0 | Status: DISCONTINUED | OUTPATIENT
Start: 2022-02-23 | End: 2022-02-24

## 2022-02-23 RX ORDER — SIMETHICONE 80 MG/1
80 TABLET, CHEWABLE ORAL EVERY 4 HOURS
Refills: 0 | Status: DISCONTINUED | OUTPATIENT
Start: 2022-02-23 | End: 2022-02-25

## 2022-02-23 RX ORDER — INFLUENZA VIRUS VACCINE 15; 15; 15; 15 UG/.5ML; UG/.5ML; UG/.5ML; UG/.5ML
0.5 SUSPENSION INTRAMUSCULAR ONCE
Refills: 0 | Status: DISCONTINUED | OUTPATIENT
Start: 2022-02-23 | End: 2022-02-25

## 2022-02-23 RX ORDER — CITRIC ACID/SODIUM CITRATE 300-500 MG
30 SOLUTION, ORAL ORAL ONCE
Refills: 0 | Status: COMPLETED | OUTPATIENT
Start: 2022-02-23 | End: 2022-02-23

## 2022-02-23 RX ORDER — TETANUS TOXOID, REDUCED DIPHTHERIA TOXOID AND ACELLULAR PERTUSSIS VACCINE, ADSORBED 5; 2.5; 8; 8; 2.5 [IU]/.5ML; [IU]/.5ML; UG/.5ML; UG/.5ML; UG/.5ML
0.5 SUSPENSION INTRAMUSCULAR ONCE
Refills: 0 | Status: DISCONTINUED | OUTPATIENT
Start: 2022-02-23 | End: 2022-02-25

## 2022-02-23 RX ORDER — HEPARIN SODIUM 5000 [USP'U]/ML
5000 INJECTION INTRAVENOUS; SUBCUTANEOUS EVERY 12 HOURS
Refills: 0 | Status: DISCONTINUED | OUTPATIENT
Start: 2022-02-23 | End: 2022-02-25

## 2022-02-23 RX ORDER — OXYCODONE HYDROCHLORIDE 5 MG/1
5 TABLET ORAL ONCE
Refills: 0 | Status: DISCONTINUED | OUTPATIENT
Start: 2022-02-23 | End: 2022-02-25

## 2022-02-23 RX ADMIN — SODIUM CHLORIDE 2000 MILLILITER(S): 9 INJECTION, SOLUTION INTRAVENOUS at 13:20

## 2022-02-23 RX ADMIN — FAMOTIDINE 20 MILLIGRAM(S): 10 INJECTION INTRAVENOUS at 13:28

## 2022-02-23 RX ADMIN — Medication 1000 MILLIUNIT(S)/MIN: at 16:00

## 2022-02-23 RX ADMIN — SODIUM CHLORIDE 75 MILLILITER(S): 9 INJECTION, SOLUTION INTRAVENOUS at 16:00

## 2022-02-23 RX ADMIN — Medication 30 MILLILITER(S): at 13:20

## 2022-02-23 NOTE — DISCHARGE NOTE OB - PATIENT PORTAL LINK FT
You can access the FollowMyHealth Patient Portal offered by [facility] by registering at the following website: [email or web address]. By joining [employer]’s FollowMyHealth portal, you will also be able to view your health information using other applications (apps) compatible with our system.

## 2022-02-23 NOTE — OB RN PATIENT PROFILE - FALL HARM RISK - UNIVERSAL INTERVENTIONS
Bed in lowest position, wheels locked, appropriate side rails in place/Call bell, personal items and telephone in reach/Instruct patient to call for assistance before getting out of bed or chair/Non-slip footwear when patient is out of bed/[place] to call system/Physically safe environment - no spills, clutter or unnecessary equipment/Purposeful Proactive Rounding/Room/bathroom lighting operational, light cord in reach

## 2022-02-23 NOTE — DISCHARGE NOTE OB - MEDICATION SUMMARY - MEDICATIONS TO TAKE
I will START or STAY ON the medications listed below when I get home from the hospital:    acetaminophen 325 mg oral tablet  -- 3 tab(s) by mouth every 6 hours, As Needed  -- Indication: For pain    ibuprofen 600 mg oral tablet  -- 1 tab(s) by mouth every 6 hours, As Needed  -- Indication: For pain    lanolin topical ointment  -- 1 application on skin every 6 hours, As needed, Sore Nipples  -- Indication: For breastfeeding    ferrous sulfate 325 mg (65 mg elemental iron) oral tablet  -- 1 tab(s) by mouth once a day  -- Indication: For anemia    simethicone 80 mg oral tablet, chewable  -- 1 tab(s) by mouth every 4 hours, As needed, Gas  -- Indication: For gas pain    ascorbic acid 500 mg oral tablet  -- 1 tab(s) by mouth once a day  -- Indication: For supplement

## 2022-02-23 NOTE — OB RN INTRAOPERATIVE NOTE - NSSELHIDDEN_OBGYN_ALL_OB_FT
[NS_DeliveryAttending1_OBGYN_ALL_OB_FT:[id number]],[NS_DeliveryRN_OBGYN_ALL_OB_FT:[id number]]

## 2022-02-23 NOTE — BRIEF OPERATIVE NOTE - NSICDXBRIEFPOSTOP_GEN_ALL_CORE_FT
POST-OP DIAGNOSIS:  Delivery by  section using transverse incision of lower segment of uterus 2022 16:07:34  Trista Henderson

## 2022-02-23 NOTE — OB PROVIDER DELIVERY SUMMARY - NS_DELIVERYATTENDING1_OBGYN_ALL_OB_FT
From: Ted Rodriguez  To: Marko Noel DO  Sent: 2/18/2020 3:47 PM CST  Subject: Medication Question    I'm uncomfortable wearing the Nuvaring. I didn't have this issue when I first used it years ago but maybe having 2 kids has made a difference. I have discomfort with tampons so maybe that's related.     Do you have another suggestion for birth control? I'm open to another IUD but with hormones or going on the pill again   Jaleesa Long MD

## 2022-02-23 NOTE — OB PROVIDER H&P - ASSESSMENT
Admit to L&ZENOBIA Long MD-Service  Dx: scheduled rLTCS  Dx: IUGR  NPO  routine labs  EFM/TOCO  anesthesia consult  Tricia Dahl PAC, C-[facility]  02-23-22 @ 12:35

## 2022-02-23 NOTE — BRIEF OPERATIVE NOTE - OPERATION/FINDINGS
Pt here for RCS, prior skin incision was utilized, LSTC delivery was performed, hysterotomy closed in a single layer with 0 vicryl suture. Muscle reapproximated, fascia reapproximated. Subcutaneous and skin closed. Pressure dressing applied. QBL 344mL.

## 2022-02-23 NOTE — DISCHARGE NOTE OB - CARE PROVIDER_API CALL
Jaleesa Henriquez)  Obstetrics and Gynecology  [address] [place] [address]  Phone: [phone]  Fax: [phone]  Follow Up Time:

## 2022-02-23 NOTE — OB PROVIDER DELIVERY SUMMARY - NSSELHIDDEN_OBGYN_ALL_OB_FT
[NS_DeliveryAttending1_OBGYN_ALL_OB_FT:[id number]=],[NS_DeliveryRN_OBGYN_ALL_OB_FT:[id number]==] [NS_DeliveryAttending1_OBGYN_ALL_OB_FT:[id number]=],[NS_DeliveryRN_OBGYN_ALL_OB_FT:[id number]==],[NS_DeliveryAssist1_OBGYN_ALL_OB_FT:[id number]==]

## 2022-02-23 NOTE — DISCHARGE NOTE OB - NS MD DC FALL RISK RISK
For information on Fall & Injury Prevention, visit: [email or web address] OR  [email or web address] OR  https://www.cdc.gov/steadi/patient.html

## 2022-02-23 NOTE — DISCHARGE NOTE OB - MATERIALS PROVIDED
Vaccinations/[facility]  Screening Program/  Immunization Record/Guide to Postpartum Care/[facility] Hearing Screen Program/Shaken Baby Prevention Handout/Birth Certificate Instructions

## 2022-02-23 NOTE — OB PROVIDER DELIVERY SUMMARY - NSPROVIDERDELIVERYNOTE_OBGYN_ALL_OB_FT
Pt here for RCS, prior skin incision was utilized, LSTC delivery was performed, hysterotomy closed in a single layer with 0 vicryl suture. Muscle reapproximated, fascia reapproximated. Subcutaneous and skin closed. Pressure dressing applied. QBL 167mL. Pt here for RCS, prior skin incision was utilized, LSTC delivery was performed, hysterotomy closed in a single layer with 0 vicryl suture. Muscle reapproximated, fascia reapproximated. Subcutaneous and skin closed. Pressure dressing applied. QBL 344mL. Pt here for RCS, prior skin incision was utilized, LSTC delivery was performed, hysterotomy closed in a single layer with 0 vicryl suture. Muscle reapproximated, fascia reapproximated. Subcutaneous and skin closed. Pressure dressing applied. QBL 344mL.    Primary surgeon - Dr. Carolann Ramachandran  Assistant - Dr. Jaleesa Long

## 2022-02-23 NOTE — DISCHARGE NOTE OB - INFLUENZA IMMUNIZATION DATE (APPROXIMATE):
Vitals: Vitals stable, 2L NC, O2 sats 96%.  Endocrine: Blood sugars stable with morning labs.  Labs: Improving LFT's and stable creatinine.  Pain: Minimal pain, grimaces at times but falls back asleep.  PRN's: No PRN's given.  Diet: Regular diet, no appetite, did not eat any food. Radiology attempting to advance placement of NJ (currently NG).  LDA: Right TL internal jugular, D5.45NS at 50cc/hr. Left dialysis line DC'd per Nephrology.  GI: No BM this shift, large incontinent loose BM this morning.  : Pure wick in place, 300cc output, diaper in place.   Skin: Bruised skin throughout, abdominal incision with melody, R/L OBED, left site leaking, dressing changed X2.  Neuro: Confused, agitated this morning. Soft restraints applied, VPM in the room. New order for PRN Seroquel obtained, no medication given as patient fell asleep. Her  was here, updated on plan of care.  Mobility: Mechanical lift to the chair, assist of 2, tolerated well for several hours.  Education: Unable to do transplant education due to mental status.  Plan: NJ to be advanced for tube feeding. Bed alarm, VPM, restraints as needed. Patient has consistently tried to pull out NJ, OBED's at times.      22-Sep-2021

## 2022-02-23 NOTE — OB PROVIDER H&P - PRO INTERPRETER NEED 2
Clinical Summary

                             Created on: 2021



Rock NOLAN Chavez

External Reference #: [id number]

: 1956

Sex: Male



Demographics





                          Address                   [address]

[place]  [address]

 

                          Home Phone                [phone]

 

                          Preferred Language        English

 

                          Marital Status            

 

                          Spiritism Affiliation     [id number]

 

                          Race                      White

 

                          Ethnic Group              Not  or 





Author





                          Author                    [facility]

 

                          Organization              [facility]

 

                          Address                   Unknown

 

                          Phone                     Unavailable







Support





                Name            Relationship    Address         Phone

 

                Ashlyn MCKEON            Unknown         [phone]







Care Team Providers





                    Care Team Member Name Role                Phone

 

                    Tawil, Elias A MD   Unavailable         [phone]

 

                    Jocy Loomis MD Unavailable         [phone]

 

                    Husam Dailey MD    Unavailable         [phone]

 

                    Self, Referral      PCP                 Unavailable

 

                    Jocy Loomis MD Unavailable         [phone]







Source Comments

Some departments are not documenting in the electronic medical record.  If you d
o not see the information that you expected, contact Release of Information in [facility] department at [phone] for further assistan
ce in locating additional records.[facility]



Allergies





                                        Comments



                 Active Allergy  Reactions       Severity        Noted Date 

 

                                        



Nausea and sore throat



                 Ciprofloxacin   NAUSEA ONLY,    Low             2020 



                                         SEE COMMENTS   







Medications





                          End Date                  Status



              Medication   Sig          Dispensed    Refills      Start  



                                         Date  

 

                                                    Active



                     aspirin EC (ASPIR-81) 81  Take 81 mg by       0   



                           mg tablet                 mouth every     



                                         24 hours.     

 

                                                    Active



              prochlorperazine  Take one     30 tablet    1            

  



                     (COMPAZINE) 5 mg tablet  tablet by           0  



                                         mouth every 6     



                                         hours as     



                                         needed for     



                                         Nausea.     

 

                                                    Active



                     Calcium-Cholecalciferol  Chew 1 tablet       0   



                           (D3) (CALCIUM 500+D) 500  by mouth     



                           mg(1,250mg) -400 unit     twice daily.     



                                         chew      

 

                                                    Active



              clobetasoL (TEMOVATE)  Please apply  60 g         3              



                     0.05 % topical      a small             1  



                           creamIndications: Palmar  amount to the     



                           plantar                   palms of     



                           erythrodysaesthesia due   hands or     



                           to cytotoxic therapy      soles of feet     



                                         twice per day     



                                         if you have     



                                         soreness in     



                                         spite of OTC     



                                         lotion     

 

                                                    Active



              lisinopriL (ZESTRIL) 40  Take one     90 tablet    3              



                     mg tablet           tablet by           1  



                                         mouth daily.     

 

                                                    Active



              hydroCHLOROthiazide  Take one     90 tablet    3              



                     (HYDRODIURIL) 25 mg  tablet by           1  



                           tablet                    mouth every     



                                         morning.     

 

                                                    Active



              cabozantinib (CABOMETYX)  Take one     30 tablet    3            0

  



                     40 mg tabletIndications:  tablet by           1  



                           Malignant neoplasm of     mouth daily.     



                           right kidney (HCC)        Take on an     



                                         empty     



                                         stomach, at     



                                         least 1 hour     



                                         before or 2     



                                         hours after     



                                         food.     

 

                                                    Active



              atorvastatin (LIPITOR) 10  Take one     90 tablet    3            

  



                     mg tablet           tablet by           1  



                                         mouth daily.     

 

                                                    Active



              sertraline (ZOLOFT) 50 mg  Take one     30 tablet    3            

  



                     tablet              tablet by           1  



                                         mouth daily.     

 

                                                    Active



              omeprazole DR (PRILOSEC)  Take one     90 capsule   1            0

  



                     40 mg capsule       capsule by          1  



                                         mouth daily     



                                         before     



                                         breakfast.     

 

                                                    Active



              morphine SR (MS CONTIN)  Take one     60 tablet    0              



                     30 mg ER tablet     tablet by           1  



                                         mouth every     



                                         12 hours     

 

                                                    Active



              HYDROcodone/acetaminophen  Take two     120 tablet   0            

  



                     (NORCO) 5/325 mg    tablets by          1  



                           tabletIndications:        mouth every 4     



                           Neoplasm related pain     hours as     



                                         needed for     



                                         Pain  May     



                                         cause     



                                         constipation;     



                                         if causes     



                                         constipation,     



                                         can take     



                                         Senna as a     



                                         laxative     

 

                                                    Active



                 ELIQUIS 5 mg tablet  Take 5 mg by    0                

 



                           mouth daily.              1  

 

                          2021                Discontinued (Reorder)



              HYDROcodone/acetaminophen  Take two     120 tablet   0            

  



                     (NORCO) 5/325 mg    tablets by          1  



                           tabletIndications:        mouth every 4     



                           Neoplasm related pain     hours as     



                                         needed for     



                                         Pain  May     



                                         cause     



                                         constipation;     



                                         if causes     



                                         constipation,     



                                         can take     



                                         Senna as a     



                                         laxative     

 

                          2021                Discontinued (Reorder)



              morphine SR (MS CONTIN)  Take one     60 tablet    0              



                     30 mg ER tablet     tablet by           1  



                                         mouth every     



                                         12 hours     







Active Problems





 



                           Problem                   Noted Date

 

 



                           Malignant neoplasm of right kidney  2020

 

 



                                         Cancer Staging: Clinical stage from : Stage IV (cT1b, pM1) -



                                         Signed by Jocy Loomis MD on 2020



 

 



                                         Overview:



                                         Formatting of this note might be differ

ent from the original.

 

 



                           Prostate cancer           2020

 

 



                                         Cancer Staging: Clinical: Unsigned



 

 



                                         Overview:



                                         Formatting of this note might be differ

ent from the original.







Encounters





                          Care Team                 Description



                     Date                Type                Specialty  

 

                                        



Jocy Loomis MD        



                     2021          Hospital            Oncology  



                                         Encounter   

 

                                        



Jocy Loomis MD       Arrived



                     2021          Office Visit        Oncology  

 

                                        



Jocy Loomis MD       Malignant neoplasm of right kidney (HCC)

 

(Primary Dx); 

Prostate cancer (HCC); 

Hypothyroidism due to medication



                     2021          Lab Only            Oncology  

 

                                        



Chanell Olvera MA                     Encounter for screening laboratory testi

ng for COVID-19 

virus in asymptomatic patient; 

Prostate cancer (HCC); 

Malignant neoplasm of right kidney (HCC)



                     2021          Orders Only         Oncology  

 

                                                     



                           2021                Travel   

 

                                        



Jocy Loomis MD       Neoplasm related pain



                     2021          Refill              Oncology  

 

                                        



Jocy Loomis MD       Prostate cancer (HCC)



                     2021          Orders Only         Oncology  

 

                                        



Jocy Loomis MD       Malignant neoplasm of right kidney (HCC)



                     2021          Refill              Oncology  

 

                                        



Jocy Loomis MD        



                     2021          Documentation       Oncology  

 

                                        



Jocy Loomis MD       Prostate cancer (HCC) (Primary Dx)



                     2021          Orders Only         Oncology  

 

                                        



Chanell Olvera MA                     Malignant neoplasm of right kidney (HCC)

; 

Prostate cancer (HCC)



                     2021          Orders Only         Oncology  

 

                                        



Krystina Hidalgo PA-C                 Malignant neoplasm of right kidney (HCC)

; 

Prostate cancer (HCC)



                     2021          Orders Only         Oncology  

 

                                        



Jocy Loomis MD       Prostate cancer (HCC); 

Malignant neoplasm of right kidney (HCC)



                     2021          Orders Only         Oncology  

 

                                        



Chanell Olvera MA                     Malignant neoplasm of right kidney (HCC)

; 

Prostate cancer (HCC)



                     2021          Orders Only         Oncology  

 

                                        



Krystina Hidalgo PA-C                 Malignant neoplasm of right kidney (HCC)

 (Primary Dx); 

Prostate cancer (HCC); 

Hypothyroidism due to medication



                     2021          Office Visit        Oncology  



                                         Telehealth   

 

                                        



Krystina Hidalgo PA-C                  



                     2021          Documentation       Oncology  

 

                                        



Faye Montano PHARMD                  



                     2021          Clinical            Oncology  



                                         Support   



                                         Telehealth   

 

                                        



Ok Alamo MD                      



                     2021          Orders Only         Otolaryngology  

 

                                        



Jocy Loomis MD        



                     2021          Documentation       Oncology  

 

                                        



Jocy Loomis MD       Prostate cancer (HCC) (Primary Dx); 

Malignant neoplasm of right kidney (HCC)



                     2021          Orders Only         Oncology  

 

                                        



Jocy Loomis MD        



                     2021          Refill              Oncology  

 

                                        



Amanda Cuellar RN                          Navigation Follow Up



                     2021          Telephone           Oncology  

 

                                        



Krystina Hidalgo PA-C                  



                     2021          Hospital            Oncology  



                                         Encounter   

 

                                        



Krystina Hidalgo PA-C                 Prostate cancer (HCC); 

Malignant neoplasm of right kidney (HCC)



                     2021          Office Visit        Oncology  

 

                                        



Krystina Hidalgo PA-C                 Malignant neoplasm of right kidney (HCC)

 (Primary Dx); 

Prostate cancer (HCC)



                     2021          Lab Only            Oncology  

 

                                                     



                           2021                Travel   

 

                                        



Jocy Loomis MD       Neoplasm related pain



                     2021          Orders Only         Oncology  

 

                                        



Ok Alamo MD                     Multiple thyroid nodules (Primary Dx)



                     2021          Orders Only         Otolaryngology  

 

                                        



Amanda Cuellar RN                          Navigation Assessment



                     2021          Telephone           Oncology  

 

                                        



Jocy Loomis MD       Multiple thyroid nodules (Primary Dx)



                     2021          Orders Only         Oncology  

 

                                        



Jocy Loomis MD        



                     2021          Refill              Oncology  

 

                                        



Jocy Loomis MD        



                     2021          Orders Only         Oncology  

 

                                        



Jocy Loomis MD       Multiple thyroid nodules (Primary Dx)



                     2021          Orders Only         Oncology  

 

                                        



Self, Referral                          Prostate cancer (HCC); 

Malignant neoplasm of right kidney (HCC); 

Hypothyroidism due to medication



                     2021          Lab Only            Oncology  

 

                                        



Jocy Loomis MD        



                     2021          Hospital            Radiology  



                                         Encounter   

 

                                        



Jocy Loomis MD       Malignant neoplasm of right kidney (HCC)

 

(Primary Dx); 

Prostate cancer (HCC); 

Hypothyroidism due to medication; 

Secondary malignant neoplasm of bone (HCC); 

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 

Neoplasm related pain



                     2021          Office Visit        Oncology  

 

                                                     



                           2021                Travel   

 

                                        



Jocy Loomis MD       Multiple thyroid nodules (Primary Dx)



                     2021          Orders Only         Oncology  



from Last 3 Months



Immunizations





  



                     Name                Administration Dates  Next Due

 

  



                     COVID-19 (MODERNA), mRNA  2021, 2021



                                         vacc, 100 mcg/0.5 mL (PF)  

 

  



                           Flu Vaccine =>64 YO       2019 



                                         High-Dose (PF)  







Surgical History





   



                 Surgery         Date            Site/Laterality  Comments

 

   



                                         HX HEART CATHETERIZATION   

 

   



                           [facility] GASTROESOPHAG REFLX    gastroesophageal reflux rep

air



                                         TEST W/INTRLUML IMPED   



                                         ELTRD   

 

   



                           LUMBAR DISC SURGERY       herniated disc on neck

 

   



                     HX CORONARY STENT   2011          stent placed circum

flex



                                         PLACEMENT   







Medical History





  



                     Medical History     Date                Comments

 

  



                                         Hypertension  

 

  



                                         Heart disease  







Family History





   



                 Medical History  Relation        Name            Comments

 

   



                           Coronary Artery Disease   Brother  

 

   



                           Heart Disease             Brother  

 

   



                           Arthritis-rheumatoid      Father  

 

   



                           Cancer                    Father  

 

   



                           Coronary Artery Disease   Father  

 

   



                           Heart Disease             Father  

 

   



                           High Cholesterol          Father  

 

   



                           Hypertension              Father  

 

   



                           Cancer-Ovarian            Mother  

 

   



                           Heart Disease             Paternal  



                                         Grandmother  

 

   



                           Coronary Artery Disease   Sister  

 

   



                           Heart Disease             Sister  







   



                 Relation        Name            Status          Comments

 

   



                                         Brother   

 

   



                                         Father   

 

   



                                         Mother   

 

   



                                         Paternal Grandmother   

 

   



                                         Sister   







Social History





                                        Date



                 Tobacco Use     Types           Packs/Day       Years Used 

 

                                         



                 Current Every Day Smoker  Cigarettes      1.5             30 

 

    



                     Smokeless Tobacco: Former  Snuff               Quit: 1985



                                         User   







                                        Tobacco Cessation: Ready to Quit: Yes









                    Drinks/Week         oz/Week             Comments



                                         Alcohol Use   

 

                                        



6 Cans of beer            6.0                        



                                         Yes   







 



                           Sex Assigned at Birth     Date Recorded

 

 



                                         Not on file 







                                        Date Recorded



                           COVID-19 Exposure         Response 

 

                                        2021  2:29 PM CDT



                           In the last month, have you been in contact with  No 

/ Unsure 



                                         someone who was confirmed or suspected 

to have  



                                         Coronavirus / COVID-19?  







Last Filed Vital Signs





                    Reading             Time Taken          Comments



                                         Vital Sign   

 

                    110/70              2021  3:32 PM CDT  



                                         Blood Pressure   

 

                    110                 2021  3:32 PM CDT  



                                         Pulse   

 

                    36.4 C (97.5 F) 2021  3:32 PM CDT  



                                         Temperature   

 

                    24                  2021  3:32 PM CDT  



                                         Respiratory Rate   

 

                    94%                 2021  3:32 PM CDT  



                                         Oxygen Saturation   

 

                    -                   -                    



                                         Inhaled Oxygen   



                                         Concentration   

 

                    85.1 kg (187 lb 9.6 oz) 2021  3:32 PM CDT  



                                         Weight   

 

                    171.6 cm (5' 7.56") 2021  3:32 PM CDT  



                                         Height   

 

                    28.9                2021  3:32 PM CDT  



                                         Body Mass Index   







Plan of Treatment





                          Care Team                 Description



                     Date                Type                Specialty  

 

                                        



Jocy Loomis MD



[address] [place] [address], [place] [id number] [phone] (Fax)                       



                     2021          Hospital            Oncology  



                                         Encounter   







   



                 Health Maintenance  Due Date        Last Done       Comments

 

   



                           MEDICARE ANNUAL WELLNESS  1956  



                                         VISIT   

 

   



                           PNEUMONIA (PPSV23)        1960  



                                         VACCINE (1 of 2 - PPSV23)   

 

   



                           DTAP/TDAP VACCINES (1 -   1972  



                                         Tdap)   

 

   



                           HEPATITIS C SCREENING     1972  

 

   



                           PHYSICAL (COMPREHENSIVE)  1972  



                                         EXAM   

 

   



                           COLORECTAL CANCER         2004  



                                         SCREENING   

 

   



                           SHINGLES RECOMBINANT      2004  



                                         VACCINE (1 of 2)   

 

   



                           ABDOMINAL AORTIC ANEURYSM  2019  



                                         SCREENING   

 

   



                     INFLUENZA VACCINE   10/01/2021          2019 

 

   



                     COVID-19 VACCINE    Completed           2021, 



                                         2021 







Procedures





                                        Comments



                 Procedure Name  Priority        Date/Time       Associated Diag

nosis 

 

                                        



Results for this procedure are in the results section.



                 PHOSPHORUS      Routine         2021      Prostate cancer

 (HCC) 



                           2:40 PM CDT               Malignant neoplasm of 



                                         right kidney (HCC) 

 

                                        



Results for this procedure are in the results section.



                 MAGNESIUM       Routine         2021      Prostate cancer

 (HCC) 



                           2:40 PM CDT               Malignant neoplasm of 



                                         right kidney (HCC) 

 

                                        



Results for this procedure are in the results section.



                 COMPREHENSIVE METABOLIC  Routine         2021      Malign

ant neoplasm of 



                     PANEL               2:40 PM CDT         right kidney (HCC) 

 

                                         



                 COVID-19 (SARS-COV-2) PCR  Routine         2021      [facility]

andi for screening 



                                         laboratory testing for 



                                         COVID-19 virus in 



                                         asymptomatic patient 



                                         Prostate cancer (HCC) 



                                         Malignant neoplasm of 



                                         right kidney (HCC) 

 

                                         



                 US DOPPLER VENOUS LEFT  Routine         2021      Prostat

e cancer (HCC) 

 

                                         



                 COMPREHENSIVE METABOLIC  Routine         2021      Malign

ant neoplasm of 



                           PANEL                     right kidney (HCC) 

 

                                         



                 CBC AND DIFF    Routine         2021      Prostate cancer

 (HCC) 



                                         Malignant neoplasm of 



                                         right kidney (HCC) 

 

                                         



                 CT CHEST W CONTRAST  Routine         2021      Malignant 

neoplasm of 



                                         right kidney (HCC) 



                                         Prostate cancer (HCC) 

 

                                         



                 CT ABD/PELV W CONTRAST  Routine         2021      Maligna

nt neoplasm of 



                                         right kidney (HCC) 



                                         Prostate cancer (HCC) 

 

                                         



                 MRI HEAD WO/W CONTRAST  Routine         2021      Maligna

nt neoplasm of 



                                         right kidney (HCC) 



                                         Prostate cancer (HCC) 

 

                                         



                 CBC AND DIFF    Routine         2021      Malignant neopl

asm of 



                                         right kidney (HCC) 



                                         Prostate cancer (HCC) 

 

                                         



                 COMPREHENSIVE METABOLIC  Routine         2021      Malign

ant neoplasm of 



                           PANEL                     right kidney (HCC) 



                                         Prostate cancer (HCC) 

 

                                        



Results for this procedure are in the results section.



                 HC TESTOSTERONE;TOTAL  Routine         2021      Prostate

 cancer (HCC) 



                           9:00 AM CDT               Malignant neoplasm of 



                                         right kidney (HCC) 

 

                                        



Results for this procedure are in the results section.



                 HC PROSTATIC SPECIF  Routine         2021      Prostate c

ancer (HCC) 



                     AG(PSA);TOT         9:00 AM CDT         Malignant neoplasm 

of 



                                         right kidney (HCC) 

 

                                        



Results for this procedure are in the results section.



                 HC 25-OH VITAMIN D  Routine         2021      Malignant n

eoplasm of 



                           9:00 AM CDT               right kidney (HCC) 

 

                                        



Results for this procedure are in the results section.



                 HC PHOSPHOROUS, SERUM  Routine         2021      Malignan

t neoplasm of 



                           9:00 AM CDT               right kidney (HCC) 

 

                                        



Results for this procedure are in the results section.



                 HC MAGNESIUM    Routine         2021      Malignant neopl

asm of 



                           9:00 AM CDT               right kidney (HCC) 

 

                                        



Results for this procedure are in the results section.



                 HC COMPREHENSIVE  Routine         2021      Malignant kylee

plasm of 



                     METABOLIC PANEL     9:00 AM CDT         right kidney (HCC) 

 

                                        



Results for this procedure are in the results section.



                     HC TSH(THYROID      Add on              2021  



                           STIMULATING HORM)         4:49 PM CDT  

 

                                        



Results for this procedure are in the results section.



                     HC FREE T4(FREE     Add on              2021  



                           THYROXINE)                4:49 PM CDT  

 

                                        



Results for this procedure are in the results section.



                 HC COMPREHENSIVE  Routine         2021      Prostate canc

er (HCC) 



                     METABOLIC PANEL     4:49 PM CDT         Malignant neoplasm 

of 



                                         right kidney (HCC) 

 

                                        



Results for this procedure are in the results section.



                 HC CBC W/ AUTOMATED DIFF  Routine         2021      Prost

ate cancer (HCC) 



                           4:49 PM CDT               Malignant neoplasm of 



                                         right kidney (HCC) 

 

                                        



Results for this procedure are in the results section.



                 US THYROID      Routine         2021      Multiple thyroi

d nodules 



                                         4:33 PM CDT  



from Last 3 Months



Results

* PHOSPHORUS (2021  2:40 PM CDT)



Only the most recent of 2 results within the time period is included.



    



              Component    Value        Ref Range    Performed At  Pathologist



                                         Signature

 

    



                 Phosphorus      3.8             2.0 - 4.5 MG/DL  [facility] 











                                         Specimen

 





                                         Blood







   



                 Performing Organization  Address         City/[facility]/ZIP Code  P

titus Number

 

   



                     [facility]            [address] [place] [address] 





* MAGNESIUM (2021  2:40 PM CDT)



Only the most recent of 2 results within the time period is included.



    



              Component    Value        Ref Range    Performed At  Pathologist



                                         Signature

 

    



                 Magnesium       1.8             1.6 - 2.6 mg/dL  [facility] 











                                         Specimen

 





                                         Blood







   



                 Performing Organization  Address         City/[facility]/ZIP Code  P

titus Number

 

   



                     [facility]            [address] [place] [address] 





* COMPREHENSIVE METABOLIC PANEL (2021  2:40 PM CDT)



Only the most recent of 5 results within the time period is included.



    



              Component    Value        Ref Range    Performed At  Pathologist



                                         Signature

 

    



                 Sodium          135 (L)         137 - 147 MMOL/L  [facility] 

 

    



                 Potassium       4.2             3.5 - 5.1 MMOL/L  [facility] 

 

    



                 Chloride        101             98 - 110 MMOL/L  [facility] 

 

    



                 Glucose         147 (H)         70 - 100 MG/DL  [facility] 

 

    



                 Blood Urea      17              7 - 25 MG/DL    [facility] 



                                         Nitrogen    

 

    



                 Creatinine      0.91            0.4 - 1.24 MG/DL  [facility] 

 

    



                 Calcium         9.6             8.5 - 10.6 MG/DL  [facility] 

 

    



                 Total Protein   6.7             6.0 - 8.0 G/DL  [facility] 

 

    



                 Total Bilirubin  0.6             0.3 - 1.2 MG/DL  [facility] 

 

    



                 Albumin         3.8             3.5 - 5.0 G/DL  [facility] 

 

    



                 Alk Phosphatase  70              25 - 110 U/L    [facility] 

 

    



                 AST (SGOT)      21              7 - 40 U/L      [facility] 

 

    



                 CO2             28              21 - 30 MMOL/L  [facility] 

 

    



                 ALT (SGPT)      19              7 - 56 U/L      [facility] 

 

    



                 Anion Gap       6               3 - 12          [facility] 

 

    



                 eGFR Non        >60             >60 mL/min      [facility] 



                                              Comment:   



                           [employer]                  The eGFR is not validated f

or   



                                         use in drug dosing   



                                         adjustments. Continue to use   



                                         estimated creatinine   



                                         clearance per dosing reference   



                                         text. Please contact the   



                                         Clinical Pharmacist for   



                                         questions.   

 

    



                 eGFR     >60             >60 mL/min      [facility] 



                           [employer]                  Comment:   



                                         The eGFR is not validated for   



                                         use in drug dosing   



                                         adjustments. Continue to use   



                                         estimated creatinine   



                                         clearance per dosing reference   



                                         text. Please contact the   



                                         Clinical Pharmacist for   



                                         questions.   











                                         Specimen

 





                                         Blood







   



                 Performing Organization  Address         City/State/ZIP Code  P

titus Number

 

   



                     [facility]            [address] [place] [address] 





* COVID-19 (SARS-COV-2) PCR (2021)



    



              Component    Value        Ref Range    Performed At  Pathologist



                                         [facility]

 

    



                           COVID-19                  [facility] 



                                         (SARS-CoV-2)    



                                         PCR    

 

    



                           COVID-19                  [facility] 



                                         (SARS-CoV-2)    



                                         PCR Source    











                                         Specimen

 





                                         Flocked Swab -



                                         Nasopharyngeal Swab







 



                           Narrative                 Performed At

 

 



                                         This result has an attachment that is n

ot available. 







   



                 Performing Organization  Address         City/State/ZIP Code  P

titus Number

 

   



                     [facility]         [address] [place]

 [address] 





* US DOPPLER VENOUS LEFT (2021)







                                         Specimen

 









 



                           Narrative                 Performed At

 

 



                                         This result has an attachment that is n

ot available. 







   



                 Performing Organization  Address         City/State/ZIP Code  P

titus Number

 

   



                                         [facility]   





* CT ABD/PELV W CONTRAST (2021)



 



                           Narrative                 Performed At

 

 



                                         This result has an attachment that is n

ot available. 







   



                 Performing Organization  Address         City/State/ZIP Code  P

titus Number

 

   



                                         [facility]   





* CT CHEST W CONTRAST (2021)



 



                           Narrative                 Performed At

 

 



                                         This result has an attachment that is n

ot available. 







   



                 Performing Organization  Address         City/[facility]/ZIP Code  P

titus Number

 

   



                                         [facility]   





* MRI HEAD WO/W CONTRAST (2021)



 



                           Narrative                 Performed At

 

 



                                         This result has an attachment that is n

ot available. 







   



                 Performing Organization  Address         City/State/ZIP Code  P

titus Number

 

   



                                         [facility]   





* CBC AND DIFF (2021)



Only the most recent of 3 results within the time period is included.



    



              Component    Value        Ref Range    Performed At  Pathologist



                                         Signature

 

    



                           White Blood               [facility] 



                                         Cells    

 

    



                           RBC                       [facility] 

 

    



                           Hemoglobin                [facility] 

 

    



                           Hematocrit                [facility] 

 

    



                           MCV                       [facility] 

 

    



                           MCH                       [facility] 

 

    



                           MCHC                      [facility] 

 

    



                           Platelet Count            [facility] 

 

    



                           MPV                       [facility] 

 

    



                           RDW                       [facility] 

 

    



                           Neutrophils               [facility] 

 

    



                           Absolute                  [facility] 



                                         Neutrophil    



                                         Count    

 

    



                           Lymphocytes               [facility] 

 

    



                           Absolute Lymph            [facility] 



                                         Count    

 

    



                           Monocytes                 [facility] 

 

    



                           Absolute                  [facility] 



                                         Monocyte Count    

 

    



                           Eosinophil                [facility] 

 

    



                           Absolute                  [facility] 



                                         Eosinophil    



                                         Count    

 

    



                           Basophils                 [facility] 

 

    



                           Absolute                  [facility] 



                                         Basophil Count    

 

    



                           Atypical Lym              [facility] 

 

    



                           Metamyelocyte             [facility] 

 

    



                           Myelocyte                 [facility] 

 

    



                           Promyelocyte              [facility] 

 

    



                           Blast                     [facility] 

 

    



                           RBC Morph                 [facility] 

 

    



                           WBC Morphology            [facility] 











                                         Specimen

 





                                         Blood - Blood







 



                           Narrative                 Performed At

 

 



                                         This result has an attachment that is n

ot available. 







   



                 Performing Organization  Address         City/State/ZIP Code  P

titus Number

 

   



                     [facility]            [address] [place] [address] 





* 25-OH VITAMIN D (D2 + D3) (2021  9:00 AM CDT)



    



              Component    Value        Ref Range    Performed At  Pathologist



                                         Signature

 

    



                 Vitamin         41.1            30 - 80 NG/ML   [facility] 



                                         D(25-OH)Total    











                                         Specimen

 





                                         Blood







   



                 Performing Organization  Address         City/[facility] Code  P

titus Number

 

   



                     [facility]         [address] [place] [address] 





* TESTOSTERONE,TOTAL (2021  9:00 AM CDT)



    



              Component    Value        Ref Range    Performed At  Pathologist



                                         [facility]

 

    



                 Testosterone,To  <10 (L)         270 - 1070 NG/DL  [facility] 



                                         robert    











                                         Specimen

 





                                         Blood







   



                 Performing Organization  Address         City/[facility]/ZIP Code  P

titus Number

 

   



                     [facility]         [address] [place] [address] 





* PROSTATIC SPECIFIC ANTIGEN-PSA (2021  9:00 AM CDT)



    



              Component    Value        Ref Range    Performed At  Pathologist



                                         [facility]

 

    



                 Prostatic       0.50            <4.01 NG/ML     [facility] 



                           Specific                  Comment:   



                           Antigen                   REFERENCE RANGES   



                                         AGE   PSA VALUE   



                                         <50    <=1.5   



                                         50-54    <=2.0   



                                         55-59    <=3.0   



                                         60-69    <=4.0   



                                         70+    <=6.0   











                                         Specimen

 





                                         Blood







   



                 Performing Organization  Address         City/[facility]/ZIP Code  P

titus Number

 

   



                     [facility]         [address] [id number] 





* THYROID STIMULATING HORMONE-TSH (2021  4:49 PM CDT)



    



              Component    Value        Ref Range    Performed At  Pathologist



                                         Signature

 

    



                 TSH             0.69            0.35 - 5.00 MCU/ML  [facility]

 











                                         Specimen

 









   



                 Performing Organization  Address         City/[facility] Code  P

titus Number

 

   



                     [facility]         [address] [id number] 





* FREE T4 (FREE THYROXINE) ONLY (2021  4:49 PM CDT)



    



              Component    Value        Ref Range    Performed At  Pathologist



                                         Signature

 

    



                 T4-Free         0.8             0.6 - 1.6 NG/DL   [facility] 











                                         Specimen

 









   



                 Performing Organization  Address         City/State/ZIP Code  P

titus Number

 

   



                      [facility]         [address] [place]

 [address] 





* US THYROID (2021  4:33 PM CDT)







                                         Specimen

 









 



                           Impressions               Performed At

 

 



                           Bilateral thyroid nodules with moderately suspicious 

features (TR4). Tissue  [facility]

[facility] RESULTS



                                         sampling of the mid right thyroid nodul

e in the inferior left thyroid nodule is





                                         recommended. Follow-up of additional no

dules is recommended in one year pending





                                         pathology. 



                                         Finalized by RANI Saucedo on 2021 4:56 PM. Dictated by 



                                         Kylah Diehl M.D. on 20 4:48 PM. 







 



                           Narrative                 Performed At

 

 



                           Ultrasound of the Neck    [facility] RESULTS



                                         Clinical Indication:Male, 65 years; t

hyroid nodules. 



                                         Technique: Multiple grayscale sonograph

ic images were obtained of the neck with





                                         additional Color Doppler acquisitions. 



                                         Comparison: CT chest 10/16/2020. 



                                         Findings: 



                                         The isthmus is normal in thickness with

out a discrete nodule. 



                                         The right lobe of the thyroid measures 

5.4 x 2.2 x 1.9 cm. The right lobe 



                                         parenchyma is heterogeneous. Multiple t

hyroid nodules are present. Reference 



                                         nodules as follows: 



                                         Nodule 1: Measures 1.8 x 0.8 x 1.5 cm i

n the mid right thyroid lobe. 



                                         Predominantly solid, isoechoic, ill-def

ined margins, wider than tall, 



                                         microcalcifications, internal vascular 

flow. TR4 



                                         Nodule 2: Measures 1.2 x 0.6 x 1.2 cm i

n the inferior right thyroid lobe. 

Solid, 



                                         isoechoic, ill-defined margins, wider t

mccullough tall, peripheral calcifications. TR4





                                         The left lobe of the thyroid measures 5

.3 x 2.2 x 1.9 cm. The left lobe 



                                         parenchyma is heterogeneous. Multiple t

hyroid nodules are present. Reference 



                                         nodules as follows: 



                                         Nodule 3: Measures 1.1 x 0.8 x 1.3 cm i

n the mid left thyroid lobe. 



                                         Predominantly solid, hypoechoic, wider 

than tall, ill-defined margins, no 



                                         calcifications. TR4 



                                         Nodule 4: Measures 2.3 x 2.1 x 2.1 cm i

n the inferior left thyroid lobe. Solid,





                                         heterogeneous but hypoechoic, wider dipti

n tall, ill-defined margins, no 



                                         calcifications. TR4 



                                         Normal size bilateral cervical lymph no

chiquita are noted without suspicious 



                                         morphologic features. 







                                        Procedure Note

 

                                        



Interface, Radiant Results - 2021  4:59 PM CDT



Formatting of this note might be different from the original.

Ultrasound of the Neck



Clinical Indication:Male, 65 years;  thyroid nodules.



Technique: Multiple grayscale sonographic images were obtained of the neck with 
additional Color Doppler acquisitions.



Comparison: CT chest 10/16/2020. 



Findings:



The isthmus is normal in thickness without a discrete nodule.



The right lobe of the thyroid measures 5.4 x 2.2 x 1.9 cm.  The right lobe 
parenchyma is heterogeneous. Multiple thyroid nodules are present. Reference 
nodules as follows:



Nodule 1: Measures 1.8 x 0.8 x 1.5 cm in the mid right thyroid lobe. 
Predominantly solid, isoechoic, ill-defined margins, wider than tall, 
microcalcifications, internal vascular flow. TR4



Nodule 2: Measures 1.2 x 0.6 x 1.2 cm in the inferior right thyroid lobe. Solid,
isoechoic, ill-defined margins, wider than tall, peripheral calcifications. TR4



The left lobe of the thyroid measures 5.3 x 2.2 x 1.9 cm.  The left lobe 
parenchyma is heterogeneous. Multiple thyroid nodules are present. Reference 
nodules as follows:



Nodule 3: Measures 1.1 x 0.8 x 1.3 cm in the mid left thyroid lobe. 
Predominantly solid, hypoechoic, wider than tall, ill-defined margins, no 
calcifications. TR4



Nodule 4: Measures 2.3 x 2.1 x 2.1 cm in the inferior left thyroid lobe. Solid, 
heterogeneous but hypoechoic, wider than tall, ill-defined margins, no 
calcifications. TR4



Normal size bilateral cervical lymph nodes are noted without suspicious 
morphologic features.



IMPRESSION





Bilateral thyroid nodules with moderately suspicious features (TR4). Tissue 
sampling of the mid right thyroid nodule in the inferior left thyroid nodule is 
recommended. Follow-up of additional nodules is recommended in one year pending 
pathology.





 Finalized by Kylah Diehl M.D. on 2021 4:56 PM. Dictated by 
Kylah Diehl M.D. on 2021 4:48 PM.









   



                 Performing Organization  Address         City/State/ZIP Code  P

titus Number

 

   



                                         [facility] RAD RESULTS   





from Last 3 Months



Insurance





                                        Type



            Payer      Benefit    Subscriber ID  Effective  Phone      Address 



                           Plan /                    Dates   



                                         Group     

 

                                        Medicare UHC MEDICARE UHC             [id number]       2021-P   



                           MEDICARE                  resent   



                                         REPLACEMEN     



                                         T     







     



            Guarantor Name  Account    Relation to  Date of    Phone      Juventinoin

g Address



                     Type                Patient             Birth  

 

     



            Rock NOLAN Chavez  Personal/F  Self       1956  [phone]  [address]



                     amily               (Home)              [place] [id number]







Advance Directives





                          Patient Representative    Explanation



                           Type                      Date Recorded  

 

                                                     



                                         Advance   



                                         Directive/DPOA English

## 2022-02-23 NOTE — DISCHARGE NOTE OB - CARE PLAN
Assessment and plan of treatment:	f/u in office in 2 weeks for wound check   Principal Discharge DX:	 delivery delivered  Assessment and plan of treatment:	f/u in office in 2 weeks for wound check   1

## 2022-02-23 NOTE — OB PROVIDER H&P - HISTORY OF PRESENT ILLNESS
27yo female  EDC 3/12/2022 presents@ 39wks for scheduled  rltcs.  +AP course IUGR otherwise unremarkable. Denies SOB,fever, chills or cough.    Denies exposure to COVID-19, negative COVID-19 test(2022)  Denies VB,ROM or UCs today.  +FM

## 2022-02-23 NOTE — OB PROVIDER H&P - NSHPPHYSICALEXAM_GEN_ALL_CORE
A&Ox3  Heart: RRR, S1&S2, no S3  Lungs: Clear bilateral to auscultation, good inspiratory /expiratory effort              no rhonchi, no rales  Abd: soft, Gravid, TOCO in place           +pfannenstial scar    EFM:  140 bpm/moderate variabilty/+accelerations/no decelerations/CAT 1  TOCO:  no UC  Ext:  FROM /bilateral  1+ LE edema   Neuro: grossly intact

## 2022-02-23 NOTE — OB RN DELIVERY SUMMARY - NSSELHIDDEN_OBGYN_ALL_OB_FT
[NS_DeliveryAttending1_OBGYN_ALL_OB_FT:[id number]],[NS_DeliveryRN_OBGYN_ALL_OB_FT:[id number]]

## 2022-02-23 NOTE — DISCHARGE NOTE OB - MEDICATION SUMMARY - MEDICATIONS TO STOP TAKING
I will STOP taking the medications listed below when I get home from the hospital:    Tums 500 mg oral tablet, chewable  -- 1 tab(s) by mouth once a day

## 2022-02-23 NOTE — OB PROVIDER H&P - NSPPHRISKEVAL_OBGYN_ALL_OB
[Follow-Up Visit] : a follow-up visit for [Other: ____] : [unfilled] Prior , uterine surgery, or multiple laparotomies

## 2022-02-24 LAB
BASOPHILS # BLD AUTO: 0.04 K/UL — SIGNIFICANT CHANGE UP (ref 0–0.2)
BASOPHILS NFR BLD AUTO: 0.2 % — SIGNIFICANT CHANGE UP (ref 0–2)
EOSINOPHIL # BLD AUTO: 0.06 K/UL — SIGNIFICANT CHANGE UP (ref 0–0.5)
EOSINOPHIL NFR BLD AUTO: 0.3 % — SIGNIFICANT CHANGE UP (ref 0–6)
HCT VFR BLD CALC: 30.3 % — LOW (ref 34.5–45)
HGB BLD-MCNC: 9.6 G/DL — LOW (ref 11.5–15.5)
IANC: 17.05 K/UL — HIGH (ref 1.5–8.5)
IMM GRANULOCYTES NFR BLD AUTO: 0.9 % — SIGNIFICANT CHANGE UP (ref 0–1.5)
LYMPHOCYTES # BLD AUTO: 13.2 % — SIGNIFICANT CHANGE UP (ref 13–44)
LYMPHOCYTES # BLD AUTO: 2.83 K/UL — SIGNIFICANT CHANGE UP (ref 1–3.3)
MCHC RBC-ENTMCNC: 24.7 PG — LOW (ref 27–34)
MCHC RBC-ENTMCNC: 31.7 GM/DL — LOW (ref 32–36)
MCV RBC AUTO: 77.9 FL — LOW (ref 80–100)
MONOCYTES # BLD AUTO: 1.3 K/UL — HIGH (ref 0–0.9)
MONOCYTES NFR BLD AUTO: 6.1 % — SIGNIFICANT CHANGE UP (ref 2–14)
NEUTROPHILS # BLD AUTO: 17.05 K/UL — HIGH (ref 1.8–7.4)
NEUTROPHILS NFR BLD AUTO: 79.3 % — HIGH (ref 43–77)
NRBC # BLD: 0 /100 WBCS — SIGNIFICANT CHANGE UP
NRBC # FLD: 0 K/UL — SIGNIFICANT CHANGE UP
PLATELET # BLD AUTO: 258 K/UL — SIGNIFICANT CHANGE UP (ref 150–400)
RBC # BLD: 3.89 M/UL — SIGNIFICANT CHANGE UP (ref 3.8–5.2)
RBC # FLD: 16.7 % — HIGH (ref 10.3–14.5)
WBC # BLD: 21.47 K/UL — HIGH (ref 3.8–10.5)
WBC # FLD AUTO: 21.47 K/UL — HIGH (ref 3.8–10.5)

## 2022-02-24 RX ORDER — IBUPROFEN 200 MG
600 TABLET ORAL EVERY 6 HOURS
Refills: 0 | Status: DISCONTINUED | OUTPATIENT
Start: 2022-02-24 | End: 2022-02-25

## 2022-02-24 RX ORDER — FERROUS SULFATE 325(65) MG
325 TABLET ORAL DAILY
Refills: 0 | Status: DISCONTINUED | OUTPATIENT
Start: 2022-02-24 | End: 2022-02-25

## 2022-02-24 RX ORDER — ASCORBIC ACID 60 MG
500 TABLET,CHEWABLE ORAL DAILY
Refills: 0 | Status: DISCONTINUED | OUTPATIENT
Start: 2022-02-24 | End: 2022-02-25

## 2022-02-24 RX ADMIN — Medication 30 MILLIGRAM(S): at 00:30

## 2022-02-24 RX ADMIN — Medication 975 MILLIGRAM(S): at 18:30

## 2022-02-24 RX ADMIN — Medication 30 MILLIGRAM(S): at 00:59

## 2022-02-24 RX ADMIN — HEPARIN SODIUM 5000 UNIT(S): 5000 INJECTION INTRAVENOUS; SUBCUTANEOUS at 00:30

## 2022-02-24 RX ADMIN — Medication 975 MILLIGRAM(S): at 07:55

## 2022-02-24 RX ADMIN — Medication 600 MILLIGRAM(S): at 22:15

## 2022-02-24 RX ADMIN — HEPARIN SODIUM 5000 UNIT(S): 5000 INJECTION INTRAVENOUS; SUBCUTANEOUS at 13:02

## 2022-02-24 RX ADMIN — Medication 600 MILLIGRAM(S): at 21:29

## 2022-02-24 RX ADMIN — Medication 30 MILLIGRAM(S): at 13:02

## 2022-02-24 RX ADMIN — Medication 30 MILLIGRAM(S): at 14:14

## 2022-02-24 RX ADMIN — Medication 975 MILLIGRAM(S): at 09:13

## 2022-02-24 RX ADMIN — Medication 975 MILLIGRAM(S): at 19:00

## 2022-02-25 VITALS
SYSTOLIC BLOOD PRESSURE: 101 MMHG | DIASTOLIC BLOOD PRESSURE: 59 MMHG | HEART RATE: 98 BPM | RESPIRATION RATE: 18 BRPM | TEMPERATURE: 98 F | OXYGEN SATURATION: 97 %

## 2022-02-25 RX ORDER — ASCORBIC ACID 60 MG
1 TABLET,CHEWABLE ORAL
Qty: 0 | Refills: 0 | DISCHARGE
Start: 2022-02-25

## 2022-02-25 RX ORDER — IBUPROFEN 200 MG
1 TABLET ORAL
Qty: 0 | Refills: 0 | DISCHARGE
Start: 2022-02-25

## 2022-02-25 RX ORDER — FERROUS SULFATE 325(65) MG
1 TABLET ORAL
Qty: 0 | Refills: 0 | DISCHARGE
Start: 2022-02-25

## 2022-02-25 RX ORDER — CALCIUM CARBONATE 500(1250)
1 TABLET ORAL
Qty: 0 | Refills: 0 | DISCHARGE

## 2022-02-25 RX ORDER — SIMETHICONE 80 MG/1
1 TABLET, CHEWABLE ORAL
Qty: 0 | Refills: 0 | DISCHARGE
Start: 2022-02-25

## 2022-02-25 RX ORDER — ACETAMINOPHEN 500 MG
3 TABLET ORAL
Qty: 0 | Refills: 0 | DISCHARGE
Start: 2022-02-25

## 2022-02-25 RX ORDER — LANOLIN
1 OINTMENT (GRAM) TOPICAL
Qty: 0 | Refills: 0 | DISCHARGE
Start: 2022-02-25

## 2022-02-25 RX ADMIN — Medication 975 MILLIGRAM(S): at 00:47

## 2022-02-25 RX ADMIN — HEPARIN SODIUM 5000 UNIT(S): 5000 INJECTION INTRAVENOUS; SUBCUTANEOUS at 00:11

## 2022-02-25 RX ADMIN — Medication 600 MILLIGRAM(S): at 13:40

## 2022-02-25 RX ADMIN — Medication 975 MILLIGRAM(S): at 01:30

## 2022-02-25 NOTE — PROGRESS NOTE ADULT - SUBJECTIVE AND OBJECTIVE BOX
OB Progress Note:  Delivery, POD#1    S: 27yo POD#1 s/p LTCS . Her pain is well controlled. She is tolerating a regular diet and not yet passing flatus. She is ambulating without difficulty and voiding spontaneously. Denies CP/SOB, lightheadedness/dizziness, N/V. Denies HA, changes in vision, RUQ pain, palpitations, increased LE edema.    O:   Vital Signs Last 24 Hrs  T(C): 36.9 (2022 01:40), Max: 36.9 (2022 01:40)  T(F): 98.5 (:40), Max: 98.5 (2022 01:40)  HR: 94 (:) (75 - 105)  BP: 96/50 (:40) (96/50 - 138/81)  BP(mean): 72 (2022 18:30) (58 - 90)  RR: 18 (:40) (15 - 28)  SpO2: 98% (2022 01:40) (98% - 100%)    Labs:  Blood type: A Positive  Rubella IgG: RPR: Negative                          11.2<L>   14.75<H> >-----------< 284    ( -23 @ 12:32 )             35.1      PE:  General: NAD  Abdomen: Mildly distended, appropriately tender, incision c/d/i.  GYN: minimal lochia on pad  Extremities: No erythema, no pitting edema  
Post-Operative Note, C/S  She is a  26y woman who is now post-operative day: 2    Subjective:  The patient feels well.  She is ambulating.   She is tolerating regular diet.  She denies nausea and vomiting; denies fever.  She is voiding.  Her pain is controlled; incisional pain is appropriate.  She reports normal postpartum bleeding.  She is breastfeeding.      Physical exam:    Vital Signs Last 24 Hrs  T(C): 36.7 (25 Feb 2022 05:21), Max: 37.1 (24 Feb 2022 14:16)  T(F): 98 (25 Feb 2022 05:21), Max: 98.7 (24 Feb 2022 14:16)  HR: 98 (25 Feb 2022 05:21) (88 - 98)  BP: 101/59 (25 Feb 2022 05:21) (97/54 - 102/68)  BP(mean): --  RR: 18 (25 Feb 2022 05:21) (16 - 18)  SpO2: 97% (25 Feb 2022 05:21) (97% - 99%)    Gen: NAD  Breast: Soft, nontender, not engorged.  Abdomen: Soft, nontender, no distension , firm uterine fundus at umbilicus.  Incision: C/D/I.  Pelvic: Normal lochia noted  Ext: No calf tenderness    LABS:                        9.6    21.47 )-----------( 258      ( 24 Feb 2022 07:01 )             30.3       Rubella status:     Allergies    No Known Allergies    Intolerances      MEDICATIONS  (STANDING):  acetaminophen     Tablet .. 975 milliGRAM(s) Oral <User Schedule>  ascorbic acid 500 milliGRAM(s) Oral daily  diphtheria/tetanus/pertussis (acellular) Vaccine (ADAcel) 0.5 milliLiter(s) IntraMuscular once  ferrous    sulfate 325 milliGRAM(s) Oral daily  heparin   Injectable 5000 Unit(s) SubCutaneous every 12 hours  ibuprofen  Tablet. 600 milliGRAM(s) Oral every 6 hours  influenza   Vaccine 0.5 milliLiter(s) IntraMuscular once    MEDICATIONS  (PRN):  diphenhydrAMINE 25 milliGRAM(s) Oral every 6 hours PRN Pruritus  lanolin Ointment 1 Application(s) Topical every 6 hours PRN Sore Nipples  magnesium hydroxide Suspension 30 milliLiter(s) Oral two times a day PRN Constipation  oxyCODONE    IR 5 milliGRAM(s) Oral every 3 hours PRN Moderate to Severe Pain (4-10)  oxyCODONE    IR 5 milliGRAM(s) Oral once PRN Moderate to Severe Pain (4-10)  simethicone 80 milliGRAM(s) Chew every 4 hours PRN Gas

## 2022-02-25 NOTE — PROGRESS NOTE ADULT - ASSESSMENT
Assessment and Plan  POD #2 s/p C/S.  Doing well and bonding with baby  Encourage ambulation.  Incisional care and PO instructions reviewed.  CPC.  Anemia  ·	continue iron as prescribed with vitamin C  ·	bleeding precautions   ·	increase po hydration  Discharge home today  RTO 2 weeks for routine post op visit/PRN  
POST OP DAY  1  s/p   SECTION    SUBJECTIVE:  No complaints.     PAIN SCALE SCORE: [x] Refer to charted pain scores    THERAPY:  [ x ] Spinal morphine   [  ] Epidural morphine   [  ] IV PCA Hydromorphone 1 mg/ml    OBJECTIVE:  Appears comfortable    Sedatioon Score:   [ x ] Alert   [  ] Drowsy    [  ] Arousable	  [  ] Asleep	[  ] Unresponsive    Side Effects:	[ x ] None    [  ] Nausea      [  ] Pruritus     [  ] Weakness   [  ] Numbness        ASSESSMENT:  Ambulating, urinating, no neadache.  No complications.    PLAN:   [   ] Discontinue    [  ] Continue    [ x ] Change to prn Analgesics as per primary service.      DOCUMENTATION & VERIFICATION OF CURRENT MEDS [ x ] Done    
  A/P: 27yo POD#1 s/p LTCS.  Patient is stable and doing well post-operatively.    - F/u AM CBC   - Continue regular diet.  - Increase ambulation.  - Continue motrin, tylenol, oxycodone PRN for pain control.      Yakelin Purvis, PGY-1

## 2022-02-28 ENCOUNTER — APPOINTMENT (OUTPATIENT)
Dept: ANTEPARTUM | Facility: CLINIC | Age: 27
End: 2022-02-28

## 2022-03-03 ENCOUNTER — APPOINTMENT (OUTPATIENT)
Dept: ANTEPARTUM | Facility: CLINIC | Age: 27
End: 2022-03-03

## 2022-04-11 LAB — SURGICAL PATHOLOGY STUDY: SIGNIFICANT CHANGE UP

## 2022-07-22 ENCOUNTER — NON-APPOINTMENT (OUTPATIENT)
Age: 27
End: 2022-07-22

## 2022-10-20 NOTE — LACTATION INITIAL EVALUATION - NIPPLE ASSESSMENT (RIGHT)
normal/medium
medium/short/pink
Mohs Histo Method Verbiage: Each section was then chromacoded and processed in the Mohs lab using the Mohs protocol and submitted for frozen section.

## 2023-06-27 NOTE — OB PROVIDER H&P - BIRTH SEX
Intensive Behavioral Therapy for Obesity    The patient was seen from 1100 to 1130 for a referral diagnosis of   Diagnosis Information      Diagnosis    278.00, V85.30 (ICD-9-CM) - E66.9, Z68.30 (ICD-10-CM) - Class 1 obesity with body mass index (BMI) of 30.0 to 30.9 in adult, unspecified obesity type, unspecified whether serious comorbidity present                in a group session (at least one other patient in group).  .    Assessment:  Per assessment:  Food and Nutrition Related History  Oral fluids: Water and Juice, 2% milk  Alcohol:  rare    Meal/Snack pattern:  3 meals per day with 4 snacks.    Breakfast  Usual time: 7  Fried egg  1/2 cup grits with butter  Water  8 oz juice  Lunch  Usual time:  11-12    Soup/sandwich  water   Dinner  Usual time:  6    Boiled turkey wing  Mixed vegetables with butter  Slice Bread with jam  Juice/water     Snack    Cookies   Candy  Ice cream  Cheetos     Snack    same Snack    Same    2 am snack  oreos  2% milk     Location of meals:   Living room and Watching TV     Physical activity: The patient's current activity is PT for leg pain, 1 / week for 60 minutes.    Areas of concern identified:  Large portions,, High calorie foods,, Concentrated sweets,, Frequent meals or snacks (grazing),, No/limited fruits and vegetables,, Drinking sweetened drinks,, Sedentary/low activity level and Lack of support,    Anthropometric Measurements:    Wt Readings from Last 9 Encounters:   06/20/23 74.3 kg (163 lb 12.8 oz)   06/16/23 73.5 kg (162 lb)   05/23/23 74.7 kg (164 lb 10.9 oz)   03/30/23 74.8 kg (165 lb)   02/27/23 72.3 kg (159 lb 8 oz)   02/23/23 70.5 kg (155 lb 8 oz)   02/01/23 69.9 kg (154 lb)   12/08/22 68.5 kg (151 lb 1.6 oz)   12/01/22 68.9 kg (152 lb)       Estimated body mass index is 30.45 kg/m² as calculated from the following:    Height as of 6/16/23: 5' 1.5\" (1.562 m).    Weight as of 6/20/23: 74.3 kg (163 lb 12.8 oz).Initial weight    Weight loss history: Joes she has always  been thin, finds current excess weight contributes to breathing difficulties. Is going to try to reduce intake of sweets and replace with fruit and/or granola bars and reduce fruit juice intake.  Lives alone but has family support.    6/20/2023: Wt 74.3 kg, says she met her goal of reducing sweet intake.   6/27/2023: Wt 74.1 kg, continues to work on reducing sweet intake and consuming better snacks.    Initial wt on 5/23/2023: 74.7 kg  Goal weight in 6 months: 71.7 kg    SMART Goal for nutrition identified by patient:  Consume healthier snacks such as fruit.    Advise:  Suggested behavior change identified by dietitian:  1. Eat regular meals - do not skip meals.  Discussed impact of skipping meals and the starve/binge cycle that leads to weight gain.  2. Regularly measure portion sizes - reviewed measuring periodically as a reality check and provided portion guide.  3. Unsweetened beverages  4. Keep food record - written or nutrition computer/phone susan.     Agree:  Treatment goals and methods  1. I will replace sweets with fruit (canned or fresh).  This will help me to lose weight by reducing calorie intake.  On a scale of 1-10, I am willing to change the behavior 7 out of 10.    Assist:  Treatment  Using behavior change techniques (self-help and/or counseling), aid the patient in achieving agreed-upon goals by acquiring the skills, confidence, and social/environmental supports for behavior change, supplemented with adjunctive medical treatments when appropriate    Arrange:    1. Follow up appointment scheduled (see After visit summary)             Limited to 15 minutes weekly 1st month; 15 minutes every other week for              month 2-6; 15 minutes every month 7-12 if lose at least 6.6 pounds in 6              months.  2. Consider Living with Chronic Disease Classes (free)       3.   Call dietitian if questions or concerns.       4.   Other continue PT      Education Materials: Plate method - [facility], and Nutrition  Apps - [facility].Eating to Feel Your Best, New Food Label, Portions, My How You've Grown! Facts on Sodium, Fiber Facts, Pasta recipe      Kathleen K Glaaser, RD      Onsite billing provider:  Kenyatta VALENCIA    Copy of progress note sent to referring provider: Carin Smalls MD   Female

## 2023-08-18 ENCOUNTER — NON-APPOINTMENT (OUTPATIENT)
Age: 28
End: 2023-08-18

## 2023-10-07 NOTE — PATIENT PROFILE OB - NS PRO PT RIGHT SUPPORT PERSON
Yes
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

## 2024-02-23 ENCOUNTER — NON-APPOINTMENT (OUTPATIENT)
Age: 29
End: 2024-02-23

## 2024-05-23 ENCOUNTER — NON-APPOINTMENT (OUTPATIENT)
Age: 29
End: 2024-05-23

## 2024-07-29 NOTE — DISCHARGE NOTE OB - MEDICATION SUMMARY - MEDICATIONS TO TAKE
Conjunctivae and eyelids appear normal,  Sclerae : White without injection 
I will START or STAY ON the medications listed below when I get home from the hospital:  None

## 2024-07-30 NOTE — ED PROVIDER NOTE - CPE EDP NEURO NORM
[FreeTextEntry1] : I, Umer Carrillo Jr, acted solely as a scribe for Dr. Loi Isaacs on this date 07/29/2024  All medical record entries made by the Scribe were at my, Dr. Loi Isaacs, direction and personally dictated by me on 07/29/2024 . I have reviewed the chart and agree that the record accurately reflects my personal performance of the history, physical exam, assessment and plan. I have also personally directed, reviewed, and agreed with the chart.  normal...

## 2024-09-09 ENCOUNTER — NON-APPOINTMENT (OUTPATIENT)
Age: 29
End: 2024-09-09

## 2025-03-21 NOTE — DISCHARGE NOTE OB - PATIENT PORTAL LINK FT
Removal Femoral sheath    The patient was in the supine position. The insertion site was identified and the sutures were removed per protocol.  The 5 [place] femoral artery sheath was then removed. Tip was intact. Manual pressure was applied for 20 minutes.   Patient tolerated procedure well. Skin warm and clean w/o bleeding or hematoma. pulses in the (r/L) lower extremity are palpable +2.      Complications: None/Other    Comments:    T(C): 36.3 (03-21-25 @ 15:22), Max: 36.9 (03-21-25 @ 07:08)  HR: 61 (03-21-25 @ 15:22) (58 - 77)  BP: 148/95 (03-21-25 @ 15:22) (130/77 - 152/72)  RR: 18 (03-21-25 @ 15:22) (14 - 20)  SpO2: 99% (03-21-25 @ 15:22) (95% - 100%)    Assessment and Plan    - Bedrest for 4 hours with right leg straight.  - Monitoring of the right groin and both lower extremities including neuro-vascular checks and vital signs every 15 minutes x 4, then every 30 minutes x 2, then every 1 hour .  - R groin post procedural care and instructions reviewed with patient. You can access the [facility] Portal, offered by [facility], by registering with the following website: [email or web address]

## 2025-08-02 ENCOUNTER — NON-APPOINTMENT (OUTPATIENT)
Age: 30
End: 2025-08-02